# Patient Record
Sex: MALE | Race: WHITE | NOT HISPANIC OR LATINO
[De-identification: names, ages, dates, MRNs, and addresses within clinical notes are randomized per-mention and may not be internally consistent; named-entity substitution may affect disease eponyms.]

---

## 2021-01-01 ENCOUNTER — RESULT REVIEW (OUTPATIENT)
Age: 86
End: 2021-01-01

## 2021-01-01 ENCOUNTER — APPOINTMENT (OUTPATIENT)
Dept: GERIATRICS | Facility: CLINIC | Age: 86
End: 2021-01-01
Payer: MEDICARE

## 2021-01-01 ENCOUNTER — APPOINTMENT (OUTPATIENT)
Dept: NEPHROLOGY | Facility: CLINIC | Age: 86
End: 2021-01-01
Payer: MEDICARE

## 2021-01-01 ENCOUNTER — LABORATORY RESULT (OUTPATIENT)
Age: 86
End: 2021-01-01

## 2021-01-01 ENCOUNTER — APPOINTMENT (OUTPATIENT)
Dept: PULMONOLOGY | Facility: CLINIC | Age: 86
End: 2021-01-01
Payer: MEDICARE

## 2021-01-01 VITALS
RESPIRATION RATE: 17 BRPM | HEART RATE: 62 BPM | TEMPERATURE: 97.9 F | HEIGHT: 67 IN | SYSTOLIC BLOOD PRESSURE: 110 MMHG | OXYGEN SATURATION: 99 % | BODY MASS INDEX: 34.37 KG/M2 | DIASTOLIC BLOOD PRESSURE: 62 MMHG | WEIGHT: 219 LBS

## 2021-01-01 VITALS
SYSTOLIC BLOOD PRESSURE: 122 MMHG | OXYGEN SATURATION: 98 % | RESPIRATION RATE: 17 BRPM | HEART RATE: 67 BPM | WEIGHT: 211 LBS | TEMPERATURE: 97.1 F | BODY MASS INDEX: 33.12 KG/M2 | DIASTOLIC BLOOD PRESSURE: 62 MMHG | HEIGHT: 67 IN

## 2021-01-01 VITALS
BODY MASS INDEX: 33.12 KG/M2 | HEIGHT: 67 IN | HEART RATE: 58 BPM | SYSTOLIC BLOOD PRESSURE: 102 MMHG | TEMPERATURE: 97.4 F | DIASTOLIC BLOOD PRESSURE: 60 MMHG | WEIGHT: 211 LBS | OXYGEN SATURATION: 98 %

## 2021-01-01 VITALS
HEIGHT: 67 IN | OXYGEN SATURATION: 97 % | WEIGHT: 211 LBS | BODY MASS INDEX: 33.12 KG/M2 | TEMPERATURE: 97.8 F | HEART RATE: 62 BPM | SYSTOLIC BLOOD PRESSURE: 124 MMHG | DIASTOLIC BLOOD PRESSURE: 60 MMHG

## 2021-01-01 DIAGNOSIS — E55.9 VITAMIN D DEFICIENCY, UNSPECIFIED: ICD-10-CM

## 2021-01-01 DIAGNOSIS — R91.1 SOLITARY PULMONARY NODULE: ICD-10-CM

## 2021-01-01 DIAGNOSIS — Z87.09 PERSONAL HISTORY OF OTHER DISEASES OF THE RESPIRATORY SYSTEM: ICD-10-CM

## 2021-01-01 DIAGNOSIS — R59.0 LOCALIZED ENLARGED LYMPH NODES: ICD-10-CM

## 2021-01-01 LAB
25(OH)D3 SERPL-MCNC: 15.5 NG/ML
ALBUMIN SERPL ELPH-MCNC: 4.4 G/DL
ALP BLD-CCNC: 80 U/L
ALT SERPL-CCNC: 14 U/L
ANION GAP SERPL CALC-SCNC: 12 MMOL/L
AST SERPL-CCNC: 18 U/L
BASOPHILS # BLD AUTO: 0 K/UL
BASOPHILS NFR BLD AUTO: 0 %
BILIRUB SERPL-MCNC: 0.5 MG/DL
BUN SERPL-MCNC: 56 MG/DL
CALCIUM SERPL-MCNC: 9.3 MG/DL
CHLORIDE SERPL-SCNC: 106 MMOL/L
CO2 SERPL-SCNC: 30 MMOL/L
CREAT SERPL-MCNC: 1.77 MG/DL
EOSINOPHIL # BLD AUTO: 0.36 K/UL
EOSINOPHIL NFR BLD AUTO: 5.3 %
GLUCOSE SERPL-MCNC: 126 MG/DL
HCT VFR BLD CALC: 34.6 %
HGB BLD-MCNC: 11.2 G/DL
LYMPHOCYTES # BLD AUTO: 1.37 K/UL
LYMPHOCYTES NFR BLD AUTO: 20.3 %
MAN DIFF?: NORMAL
MCHC RBC-ENTMCNC: 32.4 GM/DL
MCHC RBC-ENTMCNC: 35.3 PG
MCV RBC AUTO: 109.1 FL
MONOCYTES # BLD AUTO: 0.42 K/UL
MONOCYTES NFR BLD AUTO: 6.2 %
NEUTROPHILS # BLD AUTO: 4.55 K/UL
NEUTROPHILS NFR BLD AUTO: 67.3 %
PLATELET # BLD AUTO: 215 K/UL
POTASSIUM SERPL-SCNC: 4.3 MMOL/L
PROT SERPL-MCNC: 6.7 G/DL
RBC # BLD: 3.17 M/UL
RBC # FLD: 13.2 %
SODIUM SERPL-SCNC: 148 MMOL/L
TSH SERPL-ACNC: 3.58 UIU/ML
VIT B12 SERPL-MCNC: 233 PG/ML
WBC # FLD AUTO: 6.76 K/UL

## 2021-01-01 PROCEDURE — 99214 OFFICE O/P EST MOD 30 MIN: CPT

## 2021-01-01 PROCEDURE — 36415 COLL VENOUS BLD VENIPUNCTURE: CPT

## 2021-01-01 PROCEDURE — 99204 OFFICE O/P NEW MOD 45 MIN: CPT

## 2021-01-01 PROCEDURE — 99214 OFFICE O/P EST MOD 30 MIN: CPT | Mod: 25

## 2021-01-01 RX ORDER — FUROSEMIDE 40 MG/1
40 TABLET ORAL DAILY
Qty: 30 | Refills: 1 | Status: DISCONTINUED | OUTPATIENT
Start: 2021-01-01 | End: 2021-01-01

## 2021-01-01 RX ORDER — MULTIVIT-MIN/IRON/FOLIC ACID/K 18-600-40
50 MCG CAPSULE ORAL DAILY
Qty: 30 | Refills: 2 | Status: ACTIVE | OUTPATIENT
Start: 2021-01-01

## 2021-01-01 RX ORDER — FUROSEMIDE 80 MG/1
TABLET ORAL
Refills: 0 | Status: DISCONTINUED | COMMUNITY
End: 2021-01-01

## 2021-01-01 RX ORDER — PANTOPRAZOLE 40 MG/1
40 TABLET, DELAYED RELEASE ORAL DAILY
Refills: 0 | Status: ACTIVE | COMMUNITY

## 2021-01-01 RX ORDER — ZINC SULFATE TAB 220 MG (50 MG ZINC EQUIVALENT) 220 (50 ZN) MG
220 (50 ZN) TAB ORAL DAILY
Refills: 0 | Status: ACTIVE | COMMUNITY
Start: 2021-01-01

## 2021-01-01 RX ORDER — ATORVASTATIN CALCIUM 10 MG/1
10 TABLET, FILM COATED ORAL DAILY
Refills: 0 | Status: ACTIVE | COMMUNITY

## 2021-01-01 RX ORDER — DICLOFENAC SODIUM 10 MG/G
1 GEL TOPICAL
Refills: 0 | Status: DISCONTINUED | COMMUNITY
End: 2021-01-01

## 2021-01-01 RX ORDER — CHLORHEXIDINE GLUCONATE 4 %
1000 LIQUID (ML) TOPICAL DAILY
Qty: 30 | Refills: 3 | Status: ACTIVE | OUTPATIENT
Start: 2021-01-01

## 2021-01-01 RX ORDER — MULTIVITAMIN
TABLET ORAL
Qty: 90 | Refills: 3 | Status: ACTIVE | COMMUNITY
Start: 2021-01-01 | End: 1900-01-01

## 2021-01-29 PROBLEM — Z00.00 ENCOUNTER FOR PREVENTIVE HEALTH EXAMINATION: Status: ACTIVE | Noted: 2021-01-29

## 2021-02-01 ENCOUNTER — APPOINTMENT (OUTPATIENT)
Dept: PULMONOLOGY | Facility: CLINIC | Age: 86
End: 2021-02-01

## 2021-02-04 ENCOUNTER — RESULT REVIEW (OUTPATIENT)
Age: 86
End: 2021-02-04

## 2021-03-01 ENCOUNTER — APPOINTMENT (OUTPATIENT)
Dept: PULMONOLOGY | Facility: CLINIC | Age: 86
End: 2021-03-01
Payer: MEDICARE

## 2021-03-01 VITALS
WEIGHT: 203 LBS | TEMPERATURE: 97.1 F | BODY MASS INDEX: 31.86 KG/M2 | SYSTOLIC BLOOD PRESSURE: 138 MMHG | OXYGEN SATURATION: 98 % | DIASTOLIC BLOOD PRESSURE: 62 MMHG | HEIGHT: 67 IN | HEART RATE: 70 BPM

## 2021-03-01 DIAGNOSIS — J90 PLEURAL EFFUSION, NOT ELSEWHERE CLASSIFIED: ICD-10-CM

## 2021-03-01 PROCEDURE — 99205 OFFICE O/P NEW HI 60 MIN: CPT

## 2021-03-01 NOTE — REASON FOR VISIT
[Initial] : an initial visit [Abnormal CXR/ Chest CT] : an abnormal CXR/ chest CT [Sleep Apnea] : sleep apnea [Pulmonary Hypertension] : pulmonary hypertension [Pulmonary Nodules] : pulmonary nodules

## 2021-03-01 NOTE — HISTORY OF PRESENT ILLNESS
[TextBox_4] : 85 year old male with Alzheimer's dementia ELLIOT, CHF A. fib Covid infection few months ago, recently hospitalized at [facility] with acute respiratory failure due to CHF exacerbation.\par Hospital records reviewed: admitted with acute respiratory failure requiring O2 and PAP at night. BNP was elevated. CXR with vascular congestion and b/l effusions. He went for Rt thoracentesis with improvement of effusion. The TP was not sent from the effusion.\par He improved and was d/c without O2. \par Cardiology note seen: recommended BiPAP at night and diuresis.\par CXR my read; small left effusion\par CT abdomen lung windows my read: 8 mm nodule RtLL, small left effusion\par Echo: EF 40%. Enlarged Rt and Lt atria, PA pressures 62. \par ABG showed metabolic acidosis.\par \par C/o nasal congestion, for which he is using decongestants. As per medication list he is on Flonase.\par Has cough with sputum production because of that. This problem is getting worse and wants some help with it.\par Has some dyspnea but cannot describe it.\par Hx taken from his son.\par He was not aware of CHF diagnosis.\par He was diagnosed with ELLIOT 6 years ago and sleeps with CPAP.\par He sleeps well with CPAP. Uses it every night. No mask problems.\par Had Covid 2 months ago, did not require O2 or hospitalization.\par \par SHX: quit 1972 20 pack year.\par FHX: denies lung problems\par

## 2021-03-01 NOTE — REVIEW OF SYSTEMS
[Nasal Congestion] : nasal congestion [Postnasal Drip] : postnasal drip [Sinus Problems] : sinus problems [Seasonal Allergies] : seasonal allergies [Nocturia] : nocturia [Memory Loss] : memory loss [Obesity] : obesity [Negative] : Psychiatric [Edema] : no edema [GERD] : no gerd

## 2021-03-08 ENCOUNTER — RESULT REVIEW (OUTPATIENT)
Age: 86
End: 2021-03-08

## 2021-03-14 ENCOUNTER — RESULT REVIEW (OUTPATIENT)
Age: 86
End: 2021-03-14

## 2021-03-23 ENCOUNTER — TRANSCRIPTION ENCOUNTER (OUTPATIENT)
Age: 86
End: 2021-03-23

## 2021-03-24 ENCOUNTER — TRANSCRIPTION ENCOUNTER (OUTPATIENT)
Age: 86
End: 2021-03-24

## 2021-04-07 ENCOUNTER — APPOINTMENT (OUTPATIENT)
Dept: PULMONOLOGY | Facility: CLINIC | Age: 86
End: 2021-04-07
Payer: MEDICARE

## 2021-04-07 VITALS
SYSTOLIC BLOOD PRESSURE: 126 MMHG | DIASTOLIC BLOOD PRESSURE: 62 MMHG | HEART RATE: 72 BPM | WEIGHT: 203 LBS | HEIGHT: 67 IN | BODY MASS INDEX: 31.86 KG/M2 | TEMPERATURE: 97 F | OXYGEN SATURATION: 95 %

## 2021-04-07 PROCEDURE — 99072 ADDL SUPL MATRL&STAF TM PHE: CPT

## 2021-04-07 PROCEDURE — 99215 OFFICE O/P EST HI 40 MIN: CPT

## 2021-04-07 NOTE — PHYSICAL EXAM
[No Acute Distress] : no acute distress [Normal Appearance] : normal appearance [Irregular rate/rhythm] : irregular rate/rhythm [No Resp Distress] : no resp distress [No Clubbing] : no clubbing [No Cyanosis] : no cyanosis [2+ Pitting] : 2+ pitting [No Focal Deficits] : no focal deficits [Normal Affect] : normal affect [TextBox_68] : decreased BS b/l bases

## 2021-04-07 NOTE — REASON FOR VISIT
[Follow-Up] : a follow-up visit [Sleep Apnea] : sleep apnea [Shortness of Breath] : shortness of breath [Pulmonary Nodules] : pulmonary nodules [Family Member] : family member

## 2021-04-07 NOTE — HISTORY OF PRESENT ILLNESS
[TextBox_4] : 85 year old male with Alzheimer's, CHF, ELLIOT on PAP who was recently admitted to the hospital for f/u. he is with his son and wife.\par Last seen in March 2021 \par He had a CT chest and PET since then.\par CT chest my read: b/l effusions, Rt> Lt, lung nodules, significant mediastinal adenopathy\par PET scan seen, report seen: uptake in the mediastinal LN and mild uptake in the lung nodules, suspicious for lymphoma.\par PFT's with moderate obstruction, restriction and severe decrease DLCO.\par \par He has dyspnea at the same level. Albuterol seems to help. Uses it 4 times a day.\par Has orthopnea at night even with PAP. Uses PAP every night and does not have mask problems.\par Denies cough or hemoptysis.\par His son said that the nurses at his residence check spot POX and at times it is < 88%, but he is not sure if patient is on PAP or not during that time\par Used to smoke.\par Has leg edema b/l\par Did not see a Cardiologist yet\par \par

## 2021-04-07 NOTE — REVIEW OF SYSTEMS
[Postnasal Drip] : postnasal drip [Dyspnea] : dyspnea [Edema] : edema [Orthopnea] : orthopnea [Nocturia] : nocturia [Arthralgias] : arthralgias [Memory Loss] : memory loss [Obesity] : obesity [Negative] : Psychiatric

## 2021-04-08 ENCOUNTER — NON-APPOINTMENT (OUTPATIENT)
Age: 86
End: 2021-04-08

## 2021-04-09 ENCOUNTER — NON-APPOINTMENT (OUTPATIENT)
Age: 86
End: 2021-04-09

## 2021-04-15 ENCOUNTER — NON-APPOINTMENT (OUTPATIENT)
Age: 86
End: 2021-04-15

## 2021-04-16 ENCOUNTER — APPOINTMENT (OUTPATIENT)
Dept: CARDIOLOGY | Facility: CLINIC | Age: 86
End: 2021-04-16
Payer: MEDICARE

## 2021-04-16 VITALS
OXYGEN SATURATION: 92 % | BODY MASS INDEX: 31.79 KG/M2 | DIASTOLIC BLOOD PRESSURE: 60 MMHG | TEMPERATURE: 97.9 F | SYSTOLIC BLOOD PRESSURE: 136 MMHG | HEART RATE: 57 BPM | WEIGHT: 203 LBS

## 2021-04-16 DIAGNOSIS — Z80.3 FAMILY HISTORY OF MALIGNANT NEOPLASM OF BREAST: ICD-10-CM

## 2021-04-16 DIAGNOSIS — Z86.79 PERSONAL HISTORY OF OTHER DISEASES OF THE CIRCULATORY SYSTEM: ICD-10-CM

## 2021-04-16 DIAGNOSIS — Z86.39 PERSONAL HISTORY OF OTHER ENDOCRINE, NUTRITIONAL AND METABOLIC DISEASE: ICD-10-CM

## 2021-04-16 DIAGNOSIS — Z87.891 PERSONAL HISTORY OF NICOTINE DEPENDENCE: ICD-10-CM

## 2021-04-16 DIAGNOSIS — R93.89 ABNORMAL FINDINGS ON DIAGNOSTIC IMAGING OF OTHER SPECIFIED BODY STRUCTURES: ICD-10-CM

## 2021-04-16 DIAGNOSIS — Z86.69 PERSONAL HISTORY OF OTHER DISEASES OF THE NERVOUS SYSTEM AND SENSE ORGANS: ICD-10-CM

## 2021-04-16 DIAGNOSIS — U07.1 COVID-19: ICD-10-CM

## 2021-04-16 DIAGNOSIS — Z78.9 OTHER SPECIFIED HEALTH STATUS: ICD-10-CM

## 2021-04-16 PROCEDURE — 93000 ELECTROCARDIOGRAM COMPLETE: CPT

## 2021-04-16 PROCEDURE — 99214 OFFICE O/P EST MOD 30 MIN: CPT

## 2021-04-17 ENCOUNTER — NON-APPOINTMENT (OUTPATIENT)
Age: 86
End: 2021-04-17

## 2021-04-17 PROBLEM — Z80.3 FAMILY HISTORY OF MALIGNANT NEOPLASM OF BREAST: Status: ACTIVE | Noted: 2021-04-17

## 2021-04-17 PROBLEM — R93.89 ABNORMAL CHEST CT: Status: RESOLVED | Noted: 2021-04-17 | Resolved: 2021-04-17

## 2021-04-17 PROBLEM — Z87.891 FORMER SMOKER: Status: ACTIVE | Noted: 2021-04-17

## 2021-04-17 PROBLEM — Z86.79 HISTORY OF CHRONIC ATRIAL FIBRILLATION: Status: RESOLVED | Noted: 2021-04-17 | Resolved: 2021-04-17

## 2021-04-17 PROBLEM — Z78.9 SOCIAL ALCOHOL USE: Status: ACTIVE | Noted: 2021-04-17

## 2021-04-17 PROBLEM — Z86.79 HISTORY OF HYPERTENSION: Status: RESOLVED | Noted: 2021-04-17 | Resolved: 2021-04-17

## 2021-04-17 PROBLEM — Z86.39 HISTORY OF OBESITY: Status: RESOLVED | Noted: 2021-04-17 | Resolved: 2021-04-17

## 2021-04-17 PROBLEM — U07.1 COVID-19: Status: RESOLVED | Noted: 2021-04-17 | Resolved: 2021-04-17

## 2021-04-17 PROBLEM — Z86.69 HISTORY OF SLEEP APNEA: Status: RESOLVED | Noted: 2021-04-17 | Resolved: 2021-04-17

## 2021-04-17 PROBLEM — Z86.39 HISTORY OF HYPERLIPIDEMIA: Status: RESOLVED | Noted: 2021-04-17 | Resolved: 2021-04-17

## 2021-04-17 RX ORDER — MEMANTINE HYDROCHLORIDE AND DONEPEZIL HYDROCHLORIDE 28; 10 MG/1; MG/1
28-10 CAPSULE ORAL
Refills: 0 | Status: ACTIVE | COMMUNITY

## 2021-04-17 NOTE — REVIEW OF SYSTEMS
[Feeling Fatigued] : feeling fatigued [Eyeglasses] : currently wearing eyeglasses [Shortness Of Breath] : shortness of breath [see HPI] : see HPI [Joint Pain] : joint pain [Negative] : Heme/Lymph

## 2021-04-17 NOTE — PHYSICAL EXAM
[Normal Conjunctiva] : the conjunctiva exhibited no abnormalities [Bowel Sounds] : normal bowel sounds [Abdomen Soft] : soft [Abdomen Tenderness] : non-tender [Abnormal Walk] : normal gait [] : no rash [Affect] : the affect was normal [FreeTextEntry1] : pleasant

## 2021-04-17 NOTE — HISTORY OF PRESENT ILLNESS
[FreeTextEntry1] : 85-year-old man\par Cardiology consultation requested by Dr. Good because of shortness of breath\par \par Mr. Washington has known chronic atrial fibrillation and congestive heart failure. There is no history of a myocardial infarction. He was hospitalized in 1-2/21 because of acute hypoxic respiratory insufficiency. He was found to have evidence of Covid 19 viral infection and congestive heart failure. A thoracentesis revealed 800 cc of transudative pleural effusion. An echocardiographic study demonstrated a left ventricular ejection fraction 47% with pulmonary hypertension. The pulmonary systolic pressure was 60-65 mmHg. There was no significant valvular pathology.\par \par \par Mr. Washington complains of shortness of breath and orthopnea. No chest pain. No palpitations. No syncope.\par \par 3/21 Chest CAT /PET scans are consistent with lymphoma/malignancy and lung nodules . The patient and family have declined further investigation or treatment in this regard.\par \par There is a prior history of hypertension and hyperlipidemia. There is no history of diabetes.\par He smoked 2 packs of cigarettes/day stopping in 1973.\par \par \par Mr. Washington presents today for cardiovascular evaluation

## 2021-04-17 NOTE — DISCUSSION/SUMMARY
[FreeTextEntry1] : Congestive heart failure\par The working diagnosis is congestive heart failure due to heart failure with reduced ejection fraction (47% 2/21 echocardiogram) secondary to hypertensive-probable atherosclerotic heart disease. The history and physical findings are consistent with this diagnosis. In the setting of heart failure with reduced ejection fraction ACE-I/ARB  Neprilysin inhibition and beta blocker therapy are attractive. The present cardiac physical  examination is consistent with the presence of pleural effusions and New York Heart Association class III.\par \par I have recommended the following\par a. Discontinue diltiazem\par b. Carvedilol 6.25 mg b.i.d. with monitoring for bronchospasm and further dose adjustment dictated by tolerance and response\par c. Metolazone 5 mg /day with monitoring of electrolytes and renal function\par d. Thoracentesis dependent on response to above measures and the patient's clinical course\par e. No further cardiac testing for this problem at this time\par \par \par \par Atrial fibrillation\par The working diagnosis is chronic atrial fibrillation secondary to hypertensive-atherosclerotic heart disease exacerbated by obesity. An elevated"[id number]" score is indicative of an increased risk of systemic/cerebral emboli. The patient has been reportedly been treated with vitamin K antagonists therapy. However it is vague as to the monitoring of INR levels and appropriate dose adjustment of Coumadin.. Factor X A. inhibitor therapy may provide more adequate protection. In the setting of left ventricular systolic dysfunction beta blocker therapy for control of the ventricular response in  atrial fibrillation is more attractive than  calcium entry blocker therapy.\par \par I have recommended the following\par a. Discontinue diltiazem\par b. Carvedilol 6.25 mg b.i.d. with monitoring for bronchospasm and further dose adjustment dictated by tolerance and response\par c. Coumadin dose adjustment as required to target INR 2-3 through primary care physician\par d. Factor Xa. inhibitor therapy to replace Coumadin if/when patient/family consent\par \par \par Hypertension\par In the setting of left ventricular systolic dysfunction/congestive heart failure ACE-I/ARB and beta blocker therapy would provide more benefit  than calcium entry blocker therapy\par \par I have recommended the following\par a. Discontinue diltiazem\par b. Carvedilol 6.25 mg b.i.d. with monitoring for bronchospasm and further dose adjustment dictated by tolerance and response\par c. Addition of ACE-I/ARB therapy dependent on the patient's response to the above measures and hemodynamics/renal function/electrolytes\par \par \par Hyperlipidemia\par Hyperlipidemia represents a risk factor for atherosclerotic heart disease. There is no clear history of ischemic heart disease. A stress test performed 5-10 years ago was reportedly normal. The details of that evaluation are not available. The target LDL level for primary prevention is about 100. The most recent lipid levels are not available for review. The dose of atorvastatin may be increased if required to obtain optimum levels. Nonpharmacological therapy, specifically diet and exercise are emphasized his major aspects of treatment\par \par \par I have recommended the following\par a. Prior cardiac records not available at this time requested for review\par b. Target LDL level to about 100 as discussed above\par c. Low-salt low-fat low-cholesterol diet. Exercise to the extent possible\par d. Fasting lipid levels are ordered\par e. Increase atorvastatin dose if required to obtain optimum levels as noted above\par \par \par Dementia\par The patient carries a diagnosis of Alzheimer's dementia. In the setting of atrial fibrillation a multi-infarct contribution to dementia  is  common. This problem represents a major health threat. The patient's age and mental status are major influences on management decisions\par \par \par \par Obesity\par Obesity exacerbates Mr. Washington's cardiovascular issues. Today Olvin is  5 feet 7 inches tall and weighs 203 pounds. Diet exercise and weight loss are  advised\par \par \par The diagnosis, prognosis, risks, options and alternatives were explained at length to the patient and family. All questions were answered. Issues discussed included shortness of breath congestive heart failure pleural effusions left ventricular systolic dysfunction hypertension hyperlipidemia chronic obstructive lung disease noninvasive cardiac testing anticoagulation with various treatment options and monitoring of electrolytes/renal function.\par \par \par Counseling and/or coordination of care\par Time was a significant factor for this patient encounter. Total time spent with the patient and family was 60 minutes. Greater than 50% of the time was devoted to counseling and/or coordination of care\par \par \par \par \par \par

## 2021-04-27 ENCOUNTER — RESULT REVIEW (OUTPATIENT)
Age: 86
End: 2021-04-27

## 2021-04-27 ENCOUNTER — APPOINTMENT (OUTPATIENT)
Dept: GERIATRICS | Facility: CLINIC | Age: 86
End: 2021-04-27
Payer: MEDICARE

## 2021-04-27 VITALS — SYSTOLIC BLOOD PRESSURE: 110 MMHG | DIASTOLIC BLOOD PRESSURE: 60 MMHG

## 2021-04-27 VITALS
TEMPERATURE: 97.3 F | OXYGEN SATURATION: 98 % | WEIGHT: 198 LBS | BODY MASS INDEX: 31.08 KG/M2 | HEART RATE: 87 BPM | SYSTOLIC BLOOD PRESSURE: 100 MMHG | DIASTOLIC BLOOD PRESSURE: 60 MMHG | HEIGHT: 67 IN

## 2021-04-27 PROCEDURE — 99204 OFFICE O/P NEW MOD 45 MIN: CPT

## 2021-04-27 NOTE — PHYSICAL EXAM
[General Appearance - Alert] : alert [General Appearance - In No Acute Distress] : in no acute distress [General Appearance - Well Nourished] : well nourished [General Appearance - Well Developed] : well developed [General Appearance - Well-Appearing] : healthy appearing [] : normal voice and communication [Sclera] : the sclera and conjunctiva were normal [PERRL With Normal Accommodation] : pupils were equal in size, round, and reactive to light [Outer Ear] : the ears and nose were normal in appearance [Both Tympanic Membranes Were Examined] : both tympanic membranes were normal [Apical Impulse] : the apical impulse was normal [Abdomen Soft] : soft [Abdomen Tenderness] : non-tender [No Spinal Tenderness] : no spinal tenderness [Involuntary Movements] : no involuntary movements were seen [FreeTextEntry1] : [facility] 28/30

## 2021-04-27 NOTE — ASSESSMENT
[FreeTextEntry1] : pt with a fib and CHF - less edema, lost 5 lbs in 11 days\par BP low normal\par breathing better but tired\par encourage change to eliquis\par \par CT PET - possible lymphoma\par results of effusion scanned in \par \par AD - mild - some word    ffinding defecits\par scored 28/30\par lives in assisted living\par \par checking labs today\par \par to return next week with wife\par \par

## 2021-04-27 NOTE — REVIEW OF SYSTEMS
[Feeling Poorly] : feeling poorly [Feeling Tired] : feeling tired [Loss Of Hearing] : hearing loss [Confused] : confusion [Dizziness] : no dizziness [Negative] : Heme/Lymph [FreeTextEntry4] : hearing aides, post nasal drip - congestion [de-identified] : some word seraching

## 2021-04-27 NOTE — HISTORY OF PRESENT ILLNESS
[One fall no injury in past year] : Patient reported one fall in the past year without injury [Fully functional (bathing, dressing, toileting, transferring, walking, feeding)] : Fully functional (bathing, dressing, toileting, transferring, walking, feeding) [Canes] : brianne [Smoke Detector] : smoke detector [Carbon Monoxide Detector] : carbon monoxide detector [Grab Bars] : grab bars [1] : 2) Feeling down, depressed, or hopeless for several days [PHQ-2 Score ___] : PHQ-2 Score [unfilled] [Shower Chair] : no shower chair [Driving] : not driving [de-identified] : PT lives in assisting living [FreeTextEntry1] : PT said on/off depressed sometimes [FreeTextEntry4] : need to check if son official proxy

## 2021-04-29 ENCOUNTER — APPOINTMENT (OUTPATIENT)
Dept: CARDIOLOGY | Facility: CLINIC | Age: 86
End: 2021-04-29

## 2021-05-17 ENCOUNTER — APPOINTMENT (OUTPATIENT)
Dept: CARDIOLOGY | Facility: CLINIC | Age: 86
End: 2021-05-17
Payer: MEDICARE

## 2021-05-17 VITALS
HEART RATE: 92 BPM | DIASTOLIC BLOOD PRESSURE: 52 MMHG | SYSTOLIC BLOOD PRESSURE: 90 MMHG | WEIGHT: 199 LBS | BODY MASS INDEX: 31.17 KG/M2 | OXYGEN SATURATION: 98 %

## 2021-05-17 PROCEDURE — 99214 OFFICE O/P EST MOD 30 MIN: CPT

## 2021-05-18 ENCOUNTER — RESULT REVIEW (OUTPATIENT)
Age: 86
End: 2021-05-18

## 2021-05-18 ENCOUNTER — APPOINTMENT (OUTPATIENT)
Dept: GERIATRICS | Facility: CLINIC | Age: 86
End: 2021-05-18

## 2021-05-18 NOTE — HISTORY OF PRESENT ILLNESS
[FreeTextEntry1] : 85-year-old man\par Routine followup\par "I feel much better."  Olvin states that his breathing is significantly improved and peripheral edema has abated. No chest pain. No palpitations. No syncope. Main complaint is that of sinus congestion "postnasal drip"  He is anticipating an ENT evaluation with Dr. Bell

## 2021-05-18 NOTE — DISCUSSION/SUMMARY
[FreeTextEntry1] : Congestive heart failure\par The working diagnosis is compensated  congestive heart failure due to heart failure with reduced ejection fraction (47% 2/21 echocardiogram) secondary to hypertensive-probable atherosclerotic heart disease.  In the setting of heart failure with reduced ejection fraction ACE-I/ARB  Neprilysin inhibition and beta blocker therapy are attractive.\par There has been a favorable response to the present medical regimen. The present cardiac the examination is consistent with a euvolemic state New York Heart Association class II-III I.\par \par I have recommended the following\par a. Continue the present medical regimen\par b  No further cardiac testing for this problem at this time\par c. Monitoring of electrolytes and renal function through primary care Dr. Mcfadden\par d. Daily weights\par \par \par \par Atrial fibrillation\par The working diagnosis is chronic atrial fibrillation secondary to hypertensive-atherosclerotic heart disease exacerbated by obesity. An elevated"[id number]" score is indicative of an increased risk of systemic/cerebral emboli. The patient has been  been treated with vitamin K antagonists therapy. Monitoring of INR levels has been intermittent.  Factor X A. inhibitor therapy may provide more adequate reliable protection.The ventricular response in  atrial fibrillation appears to be controlled on the present medical regimen\par \par I have recommended the following\par a.  Discontinue . Coumadin \par b  Apixaban 2.5 mg bid\par c. Anticipate Holter monitor/Zio patch study to assess adequacy of rate control of the ventricular response later 2021\par \par \par \par \par Hypertension\par Hypertension is controlled on present medical regimen. In the setting of congestive heart failure left ventricular systolic dysfunction  and atrial fibrillation beta blocker and ACE-I/ARB therapy are attractive\par \par I have recommended the following\par a. Continue the present medical regimen\par b. Addition of ACE-I/ARB therapy dependent on the patient's  follow up  hemodynamics/renal function/electrolytes\par \par \par \par \par Hyperlipidemia\par Hyperlipidemia represents a risk factor for atherosclerotic heart disease. There is no clear history of ischemic heart disease. A stress test performed 5-10 years ago was reportedly normal. The details of that evaluation are not available. The target LDL level for primary prevention is about 100.HMG coA reductase inhibitor therapy has been effective. In 4/21 the  serum cholesterol level was  125 triglycerides 134 HDL 47 LDL 51. The dose of atorvastatin may be decreased dependent on followup lipid levels . Nonpharmacological therapy, specifically diet and exercise are emphasized his major aspects of treatment\par \par \par I have recommended the following\par a. Prior cardiac records not available at this time requested for review\par b. Target LDL level to about 100 as discussed above\par c. Low-salt low-fat low-cholesterol diet. Exercise to the extent possible\par d. decrease atorvastatin dose dependent on followup lipid levels  as discussed above\par \par \par \par \par Dementia\par The patient carries a diagnosis of Alzheimer's dementia. In the setting of atrial fibrillation a multi-infarct contribution to dementia  is  common. This problem represents a major health threat. The patient's age and mental status are major influences on management decisions\par \par \par \par Obesity\par Obesity exacerbates Mr. Washington's cardiovascular issues. Today Olvin is  5 feet 7 inches tall and weighs 199  pounds. Diet exercise and weight loss are  advised\par \par \par The diagnosis, prognosis, risks, options and alternatives were explained at length to the patient and family. All questions were answered. Issues discussed included shortness of breath congestive heart failure pleural effusions left ventricular systolic dysfunction hypertension hyperlipidemia chronic obstructive lung disease noninvasive cardiac testing anticoagulation with various treatment options and monitoring of electrolytes/renal function.\par \par \par Counseling and/or coordination of care\par Time was a significant factor for this patient encounter. Total time spent with the patient and family was 30  minutes. Greater than 50% of the time was devoted to counseling and/or coordination of care\par \par \par \par \par \par

## 2021-06-01 ENCOUNTER — APPOINTMENT (OUTPATIENT)
Dept: CARDIOLOGY | Facility: CLINIC | Age: 86
End: 2021-06-01
Payer: MEDICARE

## 2021-06-01 PROCEDURE — 99442: CPT | Mod: 95

## 2021-06-02 ENCOUNTER — APPOINTMENT (OUTPATIENT)
Dept: PULMONOLOGY | Facility: CLINIC | Age: 86
End: 2021-06-02
Payer: MEDICARE

## 2021-06-02 VITALS
RESPIRATION RATE: 16 BRPM | SYSTOLIC BLOOD PRESSURE: 102 MMHG | WEIGHT: 191 LBS | HEART RATE: 78 BPM | DIASTOLIC BLOOD PRESSURE: 60 MMHG | TEMPERATURE: 98 F | OXYGEN SATURATION: 98 % | BODY MASS INDEX: 29.98 KG/M2 | HEIGHT: 67 IN

## 2021-06-02 PROCEDURE — 99214 OFFICE O/P EST MOD 30 MIN: CPT

## 2021-06-02 NOTE — PHYSICAL EXAM
[No Acute Distress] : no acute distress [Normal Appearance] : normal appearance [Irregular rate/rhythm] : irregular rate/rhythm [No Resp Distress] : no resp distress [No Acc Muscle Use] : no acc muscle use [Clear to Auscultation Bilaterally] : clear to auscultation bilaterally [No Clubbing] : no clubbing [No Cyanosis] : no cyanosis [1+ Pitting] : 1+ pitting [No Focal Deficits] : no focal deficits [TextBox_140] : confused

## 2021-06-02 NOTE — HISTORY OF PRESENT ILLNESS
[TextBox_4] : 85 year old male with Alzheimer's, CHF, ELLIOT, multiple lung nodules with mediastinal adenopathy for f/u.\par Last seen in April 2021.\par At that time Incruse was added\par Had PSG with severe ELLIOT AHI 34.5, lowest saturation 80%. No Cheyne Gutiérrez or central apnea.\par No significant desaturations other that with respiratory events.\par \par He uses Incruse every day. Dyspnea is better.\par He uses PAP every night.\par Would like to have a new machine if possible.\par No cough, no hemoptysis\par He is here with his son who provides Hx.\par \par \par

## 2021-06-02 NOTE — REVIEW OF SYSTEMS
[Postnasal Drip] : postnasal drip [Dyspnea] : dyspnea [Edema] : edema [Memory Loss] : memory loss [Negative] : Gastrointestinal

## 2021-07-11 ENCOUNTER — RX RENEWAL (OUTPATIENT)
Age: 86
End: 2021-07-11

## 2021-07-11 RX ORDER — APIXABAN 2.5 MG/1
2.5 TABLET, FILM COATED ORAL
Qty: 180 | Refills: 3 | Status: ACTIVE | COMMUNITY
Start: 2021-05-17 | End: 1900-01-01

## 2021-07-20 ENCOUNTER — APPOINTMENT (OUTPATIENT)
Dept: GERIATRICS | Facility: CLINIC | Age: 86
End: 2021-07-20
Payer: MEDICARE

## 2021-07-20 VITALS
HEART RATE: 62 BPM | BODY MASS INDEX: 31.32 KG/M2 | TEMPERATURE: 98.3 F | SYSTOLIC BLOOD PRESSURE: 132 MMHG | WEIGHT: 200 LBS | DIASTOLIC BLOOD PRESSURE: 70 MMHG | OXYGEN SATURATION: 99 %

## 2021-07-20 PROCEDURE — 99214 OFFICE O/P EST MOD 30 MIN: CPT

## 2021-07-20 NOTE — ASSESSMENT
[FreeTextEntry1] : pt is feeling much better\par not doing w/up on lung nodules - no SOB\par breathing well\par bothered by runny nose - had procedure to lessen\par restart the flonase\par had labs in april\par received medical records

## 2021-07-20 NOTE — PHYSICAL EXAM
[General Appearance - Alert] : alert [General Appearance - In No Acute Distress] : in no acute distress [General Appearance - Well Nourished] : well nourished [General Appearance - Well Developed] : well developed [General Appearance - Well-Appearing] : healthy appearing [Sclera] : the sclera and conjunctiva were normal [PERRL With Normal Accommodation] : pupils were equal in size, round, and reactive to light [Outer Ear] : the ears and nose were normal in appearance [Neck Appearance] : the appearance of the neck was normal [Respiration, Rhythm And Depth] : normal respiratory rhythm and effort [Exaggerated Use Of Accessory Muscles For Inspiration] : no accessory muscle use [Auscultation Breath Sounds / Voice Sounds] : lungs were clear to auscultation bilaterally [Apical Impulse] : the apical impulse was normal [Murmurs] : no murmurs [Abdomen Soft] : soft [Abdomen Tenderness] : non-tender [No Spinal Tenderness] : no spinal tenderness [Involuntary Movements] : no involuntary movements were seen [] : no rash [No Focal Deficits] : no focal deficits [FreeTextEntry1] : [facility] 28/30 last visit

## 2021-07-20 NOTE — HISTORY OF PRESENT ILLNESS
[No falls in past year] : Patient reported no falls in the past year [Fully functional (bathing, dressing, toileting, transferring, walking, feeding)] : Fully functional (bathing, dressing, toileting, transferring, walking, feeding) [Fully functional (using the telephone, shopping, preparing meals, housekeeping, doing laundry, using transportation,] : Fully functional and needs no help or supervision to perform IADLs (using the telephone, shopping, preparing meals, housekeeping, doing laundry, using transportation, managing medications and managing finances) [0] : 2) Feeling down, depressed, or hopeless: Not at all [FreeTextEntry1] : 85 year old man here with wife and son\par \par a fib - on coumadin\par h/o covid with admit in Jan 2021 for hypoxia, plueral effusion\par chf\par alzheimers\par CT PET - pulmonary nodules - possible lymphoma\par had some positive \par \par lives  in the [place] with wife in assisted living\par \par has w/up for memory - recently switched from arciept and namenda to namzeric\par \par seeing cardiology and pulmonary drs\par neuro next week\par \par pt is retired stastics professor and psychologist - PHD from [employer]\par \par 7/20/21\par c/o runny nose\par halls/ricola\par post nasal drip\par

## 2021-08-03 ENCOUNTER — RX RENEWAL (OUTPATIENT)
Age: 86
End: 2021-08-03

## 2021-08-03 RX ORDER — CARVEDILOL 6.25 MG/1
6.25 TABLET, FILM COATED ORAL
Qty: 180 | Refills: 3 | Status: ACTIVE | COMMUNITY
Start: 2021-04-16 | End: 1900-01-01

## 2021-08-17 ENCOUNTER — APPOINTMENT (OUTPATIENT)
Dept: PULMONOLOGY | Facility: CLINIC | Age: 86
End: 2021-08-17
Payer: MEDICARE

## 2021-08-17 ENCOUNTER — APPOINTMENT (OUTPATIENT)
Dept: CARDIOLOGY | Facility: CLINIC | Age: 86
End: 2021-08-17
Payer: MEDICARE

## 2021-08-17 VITALS
HEART RATE: 67 BPM | RESPIRATION RATE: 18 BRPM | HEIGHT: 67 IN | OXYGEN SATURATION: 94 % | SYSTOLIC BLOOD PRESSURE: 126 MMHG | WEIGHT: 200 LBS | BODY MASS INDEX: 31.39 KG/M2 | DIASTOLIC BLOOD PRESSURE: 80 MMHG | TEMPERATURE: 97.2 F

## 2021-08-17 VITALS
OXYGEN SATURATION: 84 % | WEIGHT: 204.31 LBS | DIASTOLIC BLOOD PRESSURE: 46 MMHG | BODY MASS INDEX: 32.07 KG/M2 | HEART RATE: 68 BPM | SYSTOLIC BLOOD PRESSURE: 113 MMHG | HEIGHT: 67 IN

## 2021-08-17 PROCEDURE — 99214 OFFICE O/P EST MOD 30 MIN: CPT

## 2021-08-17 PROCEDURE — 99215 OFFICE O/P EST HI 40 MIN: CPT

## 2021-08-17 NOTE — REVIEW OF SYSTEMS
[Postnasal Drip] : postnasal drip [Memory Loss] : memory loss [Negative] : Hematologic [TextBox_30] : see HPI

## 2021-08-17 NOTE — REASON FOR VISIT
[Follow-Up] : a follow-up visit [Sleep Apnea] : sleep apnea [Pulmonary Hypertension] : pulmonary hypertension [Pulmonary Nodules] : pulmonary nodules

## 2021-08-17 NOTE — PHYSICAL EXAM
[No Acute Distress] : no acute distress [Normal Appearance] : normal appearance [No Neck Mass] : no neck mass [Irregular rate/rhythm] : irregular rate/rhythm [No Resp Distress] : no resp distress [No Acc Muscle Use] : no acc muscle use [Clear to Auscultation Bilaterally] : clear to auscultation bilaterally [No Clubbing] : no clubbing [No Cyanosis] : no cyanosis [No Edema] : no edema [Normal Turgor] : normal turgor [No Focal Deficits] : no focal deficits

## 2021-08-17 NOTE — HISTORY OF PRESENT ILLNESS
[TextBox_4] : 86 year old male with severe ELLIOT on PAP, PH, lung nodules and mediastinal adenopathy for f/u. He is here with his son who provides most of the history.\par Last seen in June 2021\par Compliance report 08/15:\par Usage 99%\par Average use 7 hrs 52 min\par Auto PAP 6-20\par 95% pressure 11.9\par Leak 30 LPM\par AHI 3.7\par \par He has less dyspnea. Still c/o postnasal drip.\par He wakes up 2-3 times a night for the postnasal drip.\par He had an ENT procedure for the postnasal drip. Felt better for some time but then the symptoms reoccurred.\par No cough in the daytime. No hospitalizations since the last visit\par he takes Incruse every day. No Albuterol use as per his son.\par Also ENT started on Ipratropium nasal spray.\par Sleeps well with PAP. No mask problems.\par \par SHX: does not smoke\par \par \par

## 2021-08-18 ENCOUNTER — RX RENEWAL (OUTPATIENT)
Age: 86
End: 2021-08-18

## 2021-08-19 NOTE — PHYSICAL EXAM
[Normal Conjunctiva] : the conjunctiva exhibited no abnormalities [Abdomen Soft] : soft [Bowel Sounds] : normal bowel sounds [Abdomen Tenderness] : non-tender [Abnormal Walk] : normal gait [] : no rash [Affect] : the affect was normal [FreeTextEntry1] : pleasant

## 2021-08-19 NOTE — HISTORY OF PRESENT ILLNESS
[FreeTextEntry1] : 86-year-old man\par Routine followup\par Main complaint continues to be that of sinus congestion and resulant difficulty sleeping due to  mucous collection. Sinus surgery did not provide dramatic benefit. Mr. Washington denies chest pain, shortness of breath at rest palpitations or syncope. Peripheral edema has resolved..\par \par Mr. Washington is accompanied today by his son

## 2021-08-19 NOTE — DISCUSSION/SUMMARY
[FreeTextEntry1] : Congestive heart failure\par The working diagnosis is compensated  congestive heart failure due to heart failure with reduced ejection fraction (47% 2/21 echocardiogram) secondary to hypertensive-probable atherosclerotic heart disease.  In the setting of heart failure with reduced ejection fraction ACE-I/ARB  Neprilysin inhibition and beta blocker therapy are attractive.\par There has been a favorable response to the present medical regimen. The present cardiac the examination is consistent with a euvolemic state New York Heart Association class II-III .\par \par I have recommended the following\par a. Continue the present medical regimen\par b  No further cardiac testing for this problem at this time\par c. Monitoring of electrolytes and renal function through primary care Dr. Mcfadden\par d. Daily weights\par e Echocardiogram with next office evaluation in 6 months\par \par \par \par Atrial fibrillation\par The working diagnosis is chronic atrial fibrillation secondary to hypertensive-atherosclerotic heart disease exacerbated by obesity. An elevated"[id number]" score is indicative of an increased risk of systemic/cerebral emboli. In 5/21 Apixaban replaced vitamin K antagonists therapy .The ventricular response in  atrial fibrillation appears to be controlled on the present medical regimen\par \par I have recommended the following\par a.   Anticipate Holter monitor/Zio patch study to assess adequacy of rate control of the ventricular response  2022\par \par \par \par \par Hypertension\par Hypertension is controlled on present medical regimen. In the setting of congestive heart failure left ventricular systolic dysfunction  and atrial fibrillation beta blocker and ACE-I/ARB therapy are attractive\par \par I have recommended the following\par a. Continue the present medical regimen\par b. Addition of ACE-I/ARB therapy dependent on the patient's  follow up  hemodynamics/renal function/electrolytes\par \par \par \par \par Hyperlipidemia\par Hyperlipidemia represents a risk factor for atherosclerotic heart disease. There is no clear history of ischemic heart disease. A stress test performed 5-10 years ago was reportedly normal. The details of that evaluation are not available. The target LDL level for primary prevention is about 100.HMG coA reductase inhibitor therapy has been effective. In 4/21 the  serum cholesterol level was  125 triglycerides 134 HDL 47 LDL 51. The dose of atorvastatin may be decreased dependent on followup lipid levels . Nonpharmacological therapy, specifically diet and exercise are emphasized his major aspects of treatment\par \par \par I have recommended the following\par a. Prior cardiac records not available at this time requested for review\par b. Target LDL level to about 100 as discussed above\par c. Low-salt low-fat low-cholesterol diet. Exercise to the extent possible\par d. decrease atorvastatin dose dependent on followup lipid levels  as discussed above\par \par \par \par \par Dementia\par The patient carries a diagnosis of Alzheimer's dementia. In the setting of atrial fibrillation a multi-infarct contribution to dementia  is  common. This problem represents a major health threat. The patient's age and mental status are major influences on management decisions\par \par \par \par Obesity\par Obesity exacerbates Mr. Washington's cardiovascular issues. Today Olvin is  5 feet 7 inches tall and weighs 204  pounds.  He has gained 5 pounds in the last 5 months..  Diet exercise and weight loss are  advised\par \par \par The diagnosis, prognosis, risks, options and alternatives were explained at length to the patient and family. All questions were answered. Issues discussed included shortness of breath congestive heart failure pleural effusions left ventricular systolic dysfunction hypertension hyperlipidemia chronic obstructive lung disease noninvasive cardiac testing anticoagulation with various treatment options and monitoring of electrolytes/renal function.\par \par \par Counseling and/or coordination of care\par Time was a significant factor for this patient encounter. Total time spent with the patient and family was 30  minutes. Greater than 50% of the time was devoted to counseling and/or coordination of care\par \par \par \par \par \par

## 2021-08-19 NOTE — REVIEW OF SYSTEMS
[Feeling Fatigued] : feeling fatigued [Hearing Loss] : hearing loss [Sinus Pressure] : sinus pressure [Joint Pain] : joint pain [Negative] : Heme/Lymph [FreeTextEntry3] : glasses [FreeTextEntry5] : see history of present illness

## 2021-09-17 ENCOUNTER — RX RENEWAL (OUTPATIENT)
Age: 86
End: 2021-09-17

## 2021-10-12 NOTE — REVIEW OF SYSTEMS
[Recent Weight Gain (___ Lbs)] : recent [unfilled] ~Ulb weight gain [Confused] : confusion [Negative] : Heme/Lymph [Feeling Tired] : feeling tired [FreeTextEntry4] : runny nose

## 2021-10-12 NOTE — ASSESSMENT
[FreeTextEntry1] : pt is feeling much better\par not doing w/up on lung nodules - no SOB\par breathing well\par bothered by runny nose - had procedure to lessen\par restart the flonase\par had labs in april\par received medical records\par \par 10/12/21\par forms for [employer]\par checkc labs\par had flu vaccine\par booster pending\par

## 2021-10-12 NOTE — PHYSICAL EXAM
[General Appearance - Alert] : alert [General Appearance - In No Acute Distress] : in no acute distress [General Appearance - Well Nourished] : well nourished [General Appearance - Well Developed] : well developed [General Appearance - Well-Appearing] : healthy appearing [Sclera] : the sclera and conjunctiva were normal [PERRL With Normal Accommodation] : pupils were equal in size, round, and reactive to light [Outer Ear] : the ears and nose were normal in appearance [Neck Appearance] : the appearance of the neck was normal [Respiration, Rhythm And Depth] : normal respiratory rhythm and effort [Exaggerated Use Of Accessory Muscles For Inspiration] : no accessory muscle use [Auscultation Breath Sounds / Voice Sounds] : lungs were clear to auscultation bilaterally [Apical Impulse] : the apical impulse was normal [Murmurs] : no murmurs [Abdomen Soft] : soft [Abdomen Tenderness] : non-tender [No Spinal Tenderness] : no spinal tenderness [Involuntary Movements] : no involuntary movements were seen [] : no rash [No Focal Deficits] : no focal deficits [FreeTextEntry1] : [facility] 28/30 last visit

## 2021-10-12 NOTE — HISTORY OF PRESENT ILLNESS
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[employer]\par \par 7/20/21\par c/o runny nose\par halls/ricola\par post nasal drip\par \par 10/12/21\par has been well\par still c/o runny nose\par form for residence filled out\par

## 2021-10-12 NOTE — REASON FOR VISIT
[Family Member] : family member [Follow-Up] : a follow-up visit [FreeTextEntry1] : form for residence [FreeTextEntry3] : son

## 2021-10-19 PROBLEM — E55.9 HYPOVITAMINOSIS D: Status: ACTIVE | Noted: 2021-01-01

## 2021-11-01 NOTE — ASSESSMENT
[FreeTextEntry1] : 87 yo man with PMHx of CKD, Hypertension, CHF, HLD, severe ELLIOT on CPAP, Pulmonary HTN, lung nodules and mediastinal adenopathy, Obesity, Alzheimer's dementia presents with his son who provides most of the history for renal function evaluation.\par \par \par # CKD stage 3B (eGFR 30-44 ml/min) - creatinine in April 2021 at 1.5, now up to 1.77- could be progression of ckd vs diuresis with inadequate oral hydration given hypernatremia - would check urinalysis with microscopy and urine lytes for FeNa, not in overt CHF, would hold on furosemide for now with 1.0L/day of fluid restriction and daily weight. Obtain renal/bladder ultrasound to r/o retention/obstruction. Avoid nephrotoxins/ NSAIDs. Repeat renal panel in 4 weeks. \par \par # Hypernatremia, 148- FWD 2.7 L- likely water diuresis with furosemide and inadequate oral hydration, will hold furosemide and improve oral intake\par \par # Hypertension- well controlled bp, continue current regimen, low salt diet and weight reduction\par \par # CHF- appears euvolemic, not in over exacerbation- would hold on furosemide, maintain 1.0 L/day fluid restriction, daily weight, low salt diet  \par \par # Anemia- will check iron studies \par \par # Vit D deficiency- continue Vit D supplements. Will check PTH, phos, calcium next visit\par \par # Obesity- weight reduction, exercise as tolerated\par \par follow up in 4 weeks

## 2021-11-01 NOTE — PHYSICAL EXAM
[General Appearance - Alert] : alert [General Appearance - In No Acute Distress] : in no acute distress [General Appearance - Well Nourished] : well nourished [General Appearance - Well Developed] : well developed [Extraocular Movements] : extraocular movements were intact [Neck Appearance] : the appearance of the neck was normal [Jugular Venous Distention Increased] : there was no jugular-venous distention [] : no respiratory distress [Respiration, Rhythm And Depth] : normal respiratory rhythm and effort [Exaggerated Use Of Accessory Muscles For Inspiration] : no accessory muscle use [Heart Rate And Rhythm] : heart rate was normal and rhythm regular [Heart Sounds] : normal S1 and S2 [No CVA Tenderness] : no ~M costovertebral angle tenderness [Abnormal Walk] : normal gait [Skin Turgor] : normal skin turgor [FreeTextEntry1] : Alzheimer's dementia

## 2021-11-01 NOTE — REASON FOR VISIT
[Consultation] : a consultation visit [Family Member] : family member [FreeTextEntry1] : renal function

## 2021-11-01 NOTE — HISTORY OF PRESENT ILLNESS
[FreeTextEntry1] : 87 yo man with PMHx of CKD, Hypertension, CHF, HLD, severe ELLIOT on CPAP, Pulmonary HTN, lung nodules and mediastinal adenopathy, Obesity, Alzheimer's dementia presents with his son who provides most of the history for renal function evaluation.\par \par Patient denies any recent acute illness, hospitalizations, changes in appetite or weight reduction, no fever, cp, sob, n/v/d, no abdominal or flank pain, edema. Denies dysuria, hematuria, frothy urine.\par No hx of kidney stones, pyelonephritis or relevant family hx.\par Denies NSAIDs use.\par \par /62 mmHg in the office.

## 2021-11-17 PROBLEM — R91.1 LUNG NODULE: Status: ACTIVE | Noted: 2021-03-01

## 2021-11-17 PROBLEM — Z87.09 HISTORY OF CHRONIC OBSTRUCTIVE LUNG DISEASE: Status: RESOLVED | Noted: 2021-04-07 | Resolved: 2021-04-17

## 2021-11-17 NOTE — HISTORY OF PRESENT ILLNESS
[TextBox_4] : 86 year old male with severe ELLIOT on PAP, pulmonary nodules, mediastinal adenopathy, PH, COPD came for f/u.\par Last seen in August 2021\par He is here with his son. Has Alzheimer's.\par Compliance report\par Usage 100%\par Average use 8 hrs 14 min\par Auto 6-20\par Leak 29\par 95% pressure 12.8\par AHI 5.5\par \par He c/o the same rhinorrhea at night despite using the nasal spray. \par Has the same dyspnea on exertion. No cough or chest tightness.\par He is compliant with inhalers\par Uses PAP every night and sleeps well with it. He is able to put the mask by himself.\par Did not receive new supplies [facility] yet\par Does not smoke\par He is off Lasix now after he saw Dr Maldonado \par \par \par

## 2021-11-17 NOTE — PHYSICAL EXAM
[No Acute Distress] : no acute distress [Normal Appearance] : normal appearance [No Neck Mass] : no neck mass [Normal S1, S2] : normal s1, s2 [Irregular rate/rhythm] : irregular rate/rhythm [No Resp Distress] : no resp distress [No Acc Muscle Use] : no acc muscle use [Clear to Auscultation Bilaterally] : clear to auscultation bilaterally [Normal Gait] : normal gait [No Clubbing] : no clubbing [No Cyanosis] : no cyanosis [No Edema] : no edema [No Focal Deficits] : no focal deficits [TextBox_89] : obese [TextBox_140] : awake, alert

## 2021-11-17 NOTE — REVIEW OF SYSTEMS
[Nasal Congestion] : nasal congestion [SOB on Exertion] : sob on exertion [Nocturia] : nocturia [Memory Loss] : memory loss [Obesity] : obesity [Negative] : Psychiatric

## 2021-12-02 PROBLEM — R59.0 MEDIASTINAL ADENOPATHY: Status: ACTIVE | Noted: 2021-04-07

## 2021-12-02 NOTE — HISTORY OF PRESENT ILLNESS
[FreeTextEntry1] : 87 yo man with PMHx of CKD, Hypertension, CHF, HLD, severe ELLIOT on CPAP, Pulmonary HTN, lung nodules and mediastinal adenopathy, Obesity, Alzheimer's dementia is here with son for ckd hollow up.\par \par \par Patient denies any recent acute illness, hospitalizations, changes in appetite or weight reduction, no dizziness, lightheadedness, fever, cp, n/v/d, no abdominal or flank pain, edema. Denies dysuria, hematuria, frothy urine.\par Uses CPAP every night for ELLIOT and sleeps well with it.\par Admits to weight gain from 211 to 218 lbs, as well as sob with exertion and persistent weakness, sleepiness.\par Off furosemide 40 bid but continue metolazone 5mg po qd.\par \par No hx of kidney stones, pyelonephritis or relevant family hx.\par Denies NSAIDs use.\par \par /62 mmHg in the office.\par Last BUN/Cr 56/1.77, eGFR 34 ml/min on 10/12/21, bland urine with no hematuria, proteinuria or casts \par No recent renal images available, not done.

## 2021-12-02 NOTE — ASSESSMENT
[FreeTextEntry1] : 85 yo man with PMHx of CKD, Hypertension, CHF, HLD, severe ELLIOT on CPAP, Pulmonary HTN, lung nodules and mediastinal adenopathy, Obesity, Alzheimer's dementia presents with his son who provides most of the history for renal function evaluation.\par \par \par # CKD stage 3B (eGFR 30-44 ml/min) - creatinine in April 2021 at 1.5, now up to 1.77 on 10/12/21 with bland urine (no hematuria, proteinuria or casts), no resent labs - could be progression of ckd vs diuresis with inadequate oral hydration given hypernatremia - continue to hold furosemide 40 bid and take metolazone 5 qd as it was not dc'ed; would get renal panel with urinalysis today; obtain renal/bladder ultrasound to r/o retention/obstruction. Avoid nephrotoxins/ NSAIDs. Patient is  not in overt CHF, continue with 1.0 L/day of fluid restriction and daily weight. \par \par # Hypernatremia, 148- FWD 2.7 L- likely water diuresis with furosemide and inadequate oral hydration, continue to hold furosemide, will check serum sodium today.\par \par # Hypertension - well controlled bp, continue current regimen, low salt diet and weight reduction\par \par # CHF - appears euvolemic, not in over exacerbation - would continue to hold on furosemide, continue to take metolazone 5 qd and 1.0 L/day fluid restriction, daily weight, low salt diet; will get pro-BNP; last EF 55% \par \par # Anemia, macrocytic - will check iron studies \par \par # Vit B 12 - 233, low normal; will start 1000 mcg Vit B12 po qd \par \par # Vit D deficiency- continue Vit D supplements, will check PTH, phos, calcium \par \par # Obesity- weight reduction, exercise as tolerated\par \par follow up in 2 months\par will call with results

## 2022-01-01 ENCOUNTER — RESULT REVIEW (OUTPATIENT)
Age: 87
End: 2022-01-01

## 2022-01-01 ENCOUNTER — APPOINTMENT (OUTPATIENT)
Dept: GASTROENTEROLOGY | Facility: CLINIC | Age: 87
End: 2022-01-01

## 2022-01-01 ENCOUNTER — TRANSCRIPTION ENCOUNTER (OUTPATIENT)
Age: 87
End: 2022-01-01

## 2022-01-01 ENCOUNTER — APPOINTMENT (OUTPATIENT)
Dept: NEPHROLOGY | Facility: CLINIC | Age: 87
End: 2022-01-01
Payer: MEDICARE

## 2022-01-01 ENCOUNTER — APPOINTMENT (OUTPATIENT)
Dept: NEPHROLOGY | Facility: CLINIC | Age: 87
End: 2022-01-01

## 2022-01-01 ENCOUNTER — RX RENEWAL (OUTPATIENT)
Age: 87
End: 2022-01-01

## 2022-01-01 ENCOUNTER — APPOINTMENT (OUTPATIENT)
Dept: GERIATRICS | Facility: CLINIC | Age: 87
End: 2022-01-01

## 2022-01-01 ENCOUNTER — NON-APPOINTMENT (OUTPATIENT)
Age: 87
End: 2022-01-01

## 2022-01-01 ENCOUNTER — APPOINTMENT (OUTPATIENT)
Dept: CARDIOLOGY | Facility: CLINIC | Age: 87
End: 2022-01-01
Payer: MEDICARE

## 2022-01-01 ENCOUNTER — APPOINTMENT (OUTPATIENT)
Dept: GERIATRICS | Facility: CLINIC | Age: 87
End: 2022-01-01
Payer: MEDICARE

## 2022-01-01 ENCOUNTER — APPOINTMENT (OUTPATIENT)
Dept: PULMONOLOGY | Facility: CLINIC | Age: 87
End: 2022-01-01
Payer: MEDICARE

## 2022-01-01 ENCOUNTER — APPOINTMENT (OUTPATIENT)
Dept: PULMONOLOGY | Facility: CLINIC | Age: 87
End: 2022-01-01

## 2022-01-01 ENCOUNTER — APPOINTMENT (OUTPATIENT)
Dept: CARDIOLOGY | Facility: CLINIC | Age: 87
End: 2022-01-01

## 2022-01-01 VITALS
RESPIRATION RATE: 18 BRPM | OXYGEN SATURATION: 97 % | OXYGEN SATURATION: 97 % | WEIGHT: 220 LBS | SYSTOLIC BLOOD PRESSURE: 120 MMHG | HEIGHT: 67 IN | BODY MASS INDEX: 34.53 KG/M2 | TEMPERATURE: 97.6 F | SYSTOLIC BLOOD PRESSURE: 112 MMHG | TEMPERATURE: 97.1 F | HEART RATE: 88 BPM | DIASTOLIC BLOOD PRESSURE: 70 MMHG | HEART RATE: 73 BPM | HEIGHT: 67 IN | WEIGHT: 222 LBS | DIASTOLIC BLOOD PRESSURE: 62 MMHG | BODY MASS INDEX: 34.84 KG/M2

## 2022-01-01 VITALS
OXYGEN SATURATION: 99 % | SYSTOLIC BLOOD PRESSURE: 115 MMHG | HEART RATE: 64 BPM | OXYGEN SATURATION: 98 % | WEIGHT: 217 LBS | DIASTOLIC BLOOD PRESSURE: 60 MMHG | WEIGHT: 221 LBS | HEIGHT: 67 IN | BODY MASS INDEX: 34.69 KG/M2 | DIASTOLIC BLOOD PRESSURE: 75 MMHG | SYSTOLIC BLOOD PRESSURE: 120 MMHG | HEART RATE: 75 BPM | BODY MASS INDEX: 33.99 KG/M2

## 2022-01-01 VITALS
BODY MASS INDEX: 34.61 KG/M2 | SYSTOLIC BLOOD PRESSURE: 112 MMHG | HEART RATE: 78 BPM | WEIGHT: 221 LBS | DIASTOLIC BLOOD PRESSURE: 78 MMHG | OXYGEN SATURATION: 99 %

## 2022-01-01 VITALS
SYSTOLIC BLOOD PRESSURE: 121 MMHG | TEMPERATURE: 96.6 F | OXYGEN SATURATION: 98 % | HEART RATE: 71 BPM | DIASTOLIC BLOOD PRESSURE: 54 MMHG | HEIGHT: 67 IN | WEIGHT: 217 LBS | BODY MASS INDEX: 34.06 KG/M2

## 2022-01-01 VITALS
TEMPERATURE: 97.7 F | BODY MASS INDEX: 35.47 KG/M2 | HEART RATE: 76 BPM | SYSTOLIC BLOOD PRESSURE: 124 MMHG | HEIGHT: 67 IN | WEIGHT: 226 LBS | OXYGEN SATURATION: 96 % | DIASTOLIC BLOOD PRESSURE: 64 MMHG

## 2022-01-01 VITALS
BODY MASS INDEX: 34.77 KG/M2 | HEART RATE: 85 BPM | TEMPERATURE: 97.9 F | DIASTOLIC BLOOD PRESSURE: 60 MMHG | OXYGEN SATURATION: 100 % | SYSTOLIC BLOOD PRESSURE: 126 MMHG | WEIGHT: 222 LBS

## 2022-01-01 DIAGNOSIS — E78.5 HYPERLIPIDEMIA, UNSPECIFIED: ICD-10-CM

## 2022-01-01 DIAGNOSIS — Z86.79 PERSONAL HISTORY OF OTHER DISEASES OF THE CIRCULATORY SYSTEM: ICD-10-CM

## 2022-01-01 DIAGNOSIS — R05.8 OTHER SPECIFIED COUGH: ICD-10-CM

## 2022-01-01 DIAGNOSIS — Z86.59 PERSONAL HISTORY OF OTHER MENTAL AND BEHAVIORAL DISORDERS: ICD-10-CM

## 2022-01-01 DIAGNOSIS — Z87.438 PERSONAL HISTORY OF OTHER DISEASES OF MALE GENITAL ORGANS: ICD-10-CM

## 2022-01-01 DIAGNOSIS — N18.30 CHRONIC KIDNEY DISEASE, STAGE 3 UNSPECIFIED: ICD-10-CM

## 2022-01-01 DIAGNOSIS — I27.20 PULMONARY HYPERTENSION, UNSPECIFIED: ICD-10-CM

## 2022-01-01 DIAGNOSIS — D64.9 ANEMIA, UNSPECIFIED: ICD-10-CM

## 2022-01-01 DIAGNOSIS — E87.0 HYPEROSMOLALITY AND HYPERNATREMIA: ICD-10-CM

## 2022-01-01 DIAGNOSIS — I38 ENDOCARDITIS, VALVE UNSPECIFIED: ICD-10-CM

## 2022-01-01 DIAGNOSIS — N18.9 CHRONIC KIDNEY DISEASE, UNSPECIFIED: ICD-10-CM

## 2022-01-01 DIAGNOSIS — Z99.89 OBSTRUCTIVE SLEEP APNEA (ADULT) (PEDIATRIC): ICD-10-CM

## 2022-01-01 DIAGNOSIS — G47.33 OBSTRUCTIVE SLEEP APNEA (ADULT) (PEDIATRIC): ICD-10-CM

## 2022-01-01 DIAGNOSIS — N28.1 CYST OF KIDNEY, ACQUIRED: ICD-10-CM

## 2022-01-01 DIAGNOSIS — E79.0 HYPERURICEMIA W/OUT SIGNS OF INFLAMMATORY ARTHRITIS AND TOPHACEOUS DISEASE: ICD-10-CM

## 2022-01-01 DIAGNOSIS — N25.81 SECONDARY HYPERPARATHYROIDISM OF RENAL ORIGIN: ICD-10-CM

## 2022-01-01 DIAGNOSIS — I10 ESSENTIAL (PRIMARY) HYPERTENSION: ICD-10-CM

## 2022-01-01 DIAGNOSIS — E66.9 OBESITY, UNSPECIFIED: ICD-10-CM

## 2022-01-01 DIAGNOSIS — I48.20 CHRONIC ATRIAL FIBRILLATION, UNSP: ICD-10-CM

## 2022-01-01 DIAGNOSIS — I50.9 HEART FAILURE, UNSPECIFIED: ICD-10-CM

## 2022-01-01 DIAGNOSIS — Z86.39 PERSONAL HISTORY OF OTHER ENDOCRINE, NUTRITIONAL AND METABOLIC DISEASE: ICD-10-CM

## 2022-01-01 DIAGNOSIS — F02.80 ALZHEIMER'S DISEASE, UNSPECIFIED: ICD-10-CM

## 2022-01-01 DIAGNOSIS — E53.8 DEFICIENCY OF OTHER SPECIFIED B GROUP VITAMINS: ICD-10-CM

## 2022-01-01 DIAGNOSIS — G30.9 ALZHEIMER'S DISEASE, UNSPECIFIED: ICD-10-CM

## 2022-01-01 DIAGNOSIS — J44.9 CHRONIC OBSTRUCTIVE PULMONARY DISEASE, UNSPECIFIED: ICD-10-CM

## 2022-01-01 LAB
APPEARANCE: CLEAR
BACTERIA: NEGATIVE
BILIRUBIN URINE: NEGATIVE
BLOOD URINE: NEGATIVE
COLOR: YELLOW
GLUCOSE QUALITATIVE U: NEGATIVE
HYALINE CASTS: 0 /LPF
KETONES URINE: NEGATIVE
LEUKOCYTE ESTERASE URINE: NEGATIVE
MICROSCOPIC-UA: NORMAL
NITRITE URINE: NEGATIVE
OSMOLALITY UR: 603 MOSM/KG
PH URINE: 5.5
PROTEIN URINE: NEGATIVE
RED BLOOD CELLS URINE: 2 /HPF
SPECIFIC GRAVITY URINE: >=1.03
SQUAMOUS EPITHELIAL CELLS: 1 /HPF
UROBILINOGEN URINE: NORMAL
WHITE BLOOD CELLS URINE: 1 /HPF

## 2022-01-01 PROCEDURE — 99213 OFFICE O/P EST LOW 20 MIN: CPT | Mod: 25

## 2022-01-01 PROCEDURE — 99214 OFFICE O/P EST MOD 30 MIN: CPT

## 2022-01-01 PROCEDURE — 36415 COLL VENOUS BLD VENIPUNCTURE: CPT

## 2022-01-01 PROCEDURE — 99213 OFFICE O/P EST LOW 20 MIN: CPT

## 2022-01-01 PROCEDURE — P9615: CPT

## 2022-01-01 PROCEDURE — 45382 COLONOSCOPY W/CONTROL BLEED: CPT

## 2022-01-01 PROCEDURE — 93306 TTE W/DOPPLER COMPLETE: CPT

## 2022-01-01 PROCEDURE — 93000 ELECTROCARDIOGRAM COMPLETE: CPT

## 2022-01-01 RX ORDER — DICLOFENAC SODIUM 10 MG/G
1 GEL TOPICAL
Refills: 0 | Status: ACTIVE | COMMUNITY

## 2022-01-01 RX ORDER — FUROSEMIDE 80 MG/1
TABLET ORAL
Refills: 0 | Status: ACTIVE | COMMUNITY

## 2022-01-01 RX ORDER — AZELASTINE HYDROCHLORIDE 137 UG/1
0.1 SPRAY, METERED NASAL TWICE DAILY
Qty: 1 | Refills: 4 | Status: ACTIVE | COMMUNITY
Start: 2022-01-01 | End: 1900-01-01

## 2022-01-01 RX ORDER — IPRATROPIUM BROMIDE 42 UG/1
0.06 SPRAY NASAL
Refills: 0 | Status: ACTIVE | COMMUNITY

## 2022-01-01 RX ORDER — METOLAZONE 5 MG/1
5 TABLET ORAL DAILY
Qty: 30 | Refills: 1 | Status: DISCONTINUED | COMMUNITY
Start: 2022-01-01 | End: 2022-01-01

## 2022-01-01 RX ORDER — ALLOPURINOL 100 MG/1
100 TABLET ORAL DAILY
Qty: 30 | Refills: 3 | Status: ACTIVE | COMMUNITY
Start: 2022-01-01 | End: 1900-01-01

## 2022-01-01 RX ORDER — METOLAZONE 5 MG/1
5 TABLET ORAL
Qty: 30 | Refills: 2 | Status: ACTIVE | COMMUNITY
Start: 2022-01-01

## 2022-01-01 RX ORDER — UBIDECARENONE/VIT E ACET 100MG-5
25 MCG CAPSULE ORAL
Qty: 90 | Refills: 3 | Status: DISCONTINUED | COMMUNITY
Start: 2021-01-01 | End: 2022-01-01

## 2022-01-01 RX ORDER — TERAZOSIN 5 MG/1
5 CAPSULE ORAL
Qty: 28 | Refills: 0 | Status: ACTIVE | COMMUNITY
Start: 2022-01-01

## 2022-01-01 RX ORDER — ALBUTEROL SULFATE 90 UG/1
108 (90 BASE) INHALANT RESPIRATORY (INHALATION)
Qty: 3 | Refills: 3 | Status: ACTIVE | COMMUNITY
Start: 1900-01-01 | End: 1900-01-01

## 2022-01-01 RX ORDER — GUAIFENESIN 600 MG/1
600 TABLET, EXTENDED RELEASE ORAL
Qty: 30 | Refills: 3 | Status: ACTIVE | COMMUNITY
Start: 2022-01-01 | End: 1900-01-01

## 2022-01-01 RX ORDER — UMECLIDINIUM 62.5 UG/1
62.5 AEROSOL, POWDER ORAL
Qty: 1 | Refills: 5 | Status: ACTIVE | COMMUNITY
Start: 2021-04-07 | End: 1900-01-01

## 2022-01-01 RX ORDER — DESVENLAFAXINE SUCCINATE 100 MG/1
TABLET, FILM COATED, EXTENDED RELEASE ORAL
Refills: 0 | Status: ACTIVE | COMMUNITY

## 2022-02-10 PROBLEM — Z86.79 HISTORY OF HEART VALVE ABNORMALITY: Status: RESOLVED | Noted: 2021-04-17 | Resolved: 2022-01-01

## 2022-03-02 PROBLEM — I27.20 PULMONARY HYPERTENSION: Status: ACTIVE | Noted: 2021-03-01

## 2022-03-02 NOTE — REVIEW OF SYSTEMS
[Feeling Fatigued] : feeling fatigued [Hearing Loss] : hearing loss [Joint Pain] : joint pain [Negative] : Heme/Lymph [FreeTextEntry5] : see history of present illness

## 2022-03-02 NOTE — REVIEW OF SYSTEMS
[Postnasal Drip] : postnasal drip [Nocturia] : nocturia [Memory Loss] : memory loss [Obesity] : obesity [Negative] : Hematologic [TextBox_30] : see HPI

## 2022-03-02 NOTE — REASON FOR VISIT
[Follow-Up] : a follow-up visit [Sleep Apnea] : sleep apnea [COPD] : COPD [Pulmonary Hypertension] : pulmonary hypertension

## 2022-03-02 NOTE — PHYSICAL EXAM
[No Acute Distress] : no acute distress [Normal Appearance] : normal appearance [No Neck Mass] : no neck mass [Normal S1, S2] : normal s1, s2 [Irregular rate/rhythm] : irregular rate/rhythm [No Resp Distress] : no resp distress [Clear to Auscultation Bilaterally] : clear to auscultation bilaterally [No Abnormalities] : no abnormalities [No Clubbing] : no clubbing [No Cyanosis] : no cyanosis [No Focal Deficits] : no focal deficits [No Edema] : no edema

## 2022-03-02 NOTE — DISCUSSION/SUMMARY
[FreeTextEntry1] : Congestive heart failure\par The working diagnosis is compensated  congestive heart failure due to heart failure with reduced ejection fraction (47% 2/21 echocardiogram) secondary to hypertensive-probable atherosclerotic heart disease. \par There has been favorable  response to medical therapy. The 2/22 echocardiogram revealed normal left ventricular systolic function with a left ventricular ejection fraction 57%.\par  In the setting of heart failure with reduced ejection fraction ACE-I/ARB  Neprilysin inhibition and beta blocker therapy are attractive.\par  The present cardiac the examination is consistent with a euvolemic state New York Heart Association class II-III .\par \par I have recommended the following\par a. Continue the present medical regimen\par b  No further cardiac testing for this problem at this time\par c. Monitoring of electrolytes and renal function through primary care Dr. Mcfadden\par \par \par \par \par Atrial fibrillation\par The working diagnosis is chronic atrial fibrillation secondary to hypertensive-atherosclerotic heart disease exacerbated by obesity. An elevated"[id number]" score is indicative of an increased risk of systemic/cerebral emboli. In 5/21 Apixaban replaced vitamin K antagonists therapy .The ventricular response in  atrial fibrillation appears to be controlled on the present medical regimen\par \par I have recommended the following\par a.   Anticipate Holter monitor/Zio patch study to assess adequacy of rate control of the ventricular response   later 2022 or 2023 \par \par \par \par \par Hypertension\par Hypertension is controlled on present medical regimen. In the setting of congestive heart failure left ventricular systolic dysfunction  and atrial fibrillation beta blocker and ACE-I/ARB therapy are attractive\par \par I have recommended the following\par a. Continue the present medical regimen\par b. Addition of ACE-I/ARB therapy dependent on the patient's  follow up  hemodynamics/renal function/electrolytes\par \par \par \par \par Hyperlipidemia\par Hyperlipidemia represents a risk factor for atherosclerotic heart disease. There is no clear history of ischemic heart disease. A stress test performed 5-10 years ago was reportedly normal. The details of that evaluation are not available. The target LDL level for primary prevention is about 100.HMG coA reductase inhibitor therapy has been effective. In 4/21 the  serum cholesterol level was  125 triglycerides 134 HDL 47 LDL 51. The dose of atorvastatin may be decreased dependent on followup lipid levels . Nonpharmacological therapy, specifically diet and exercise are emphasized  as  major aspects of treatment\par \par \par I have recommended the following\par a. Prior cardiac records not available at this time requested for review\par b. Target LDL level to about 100 as discussed above\par c. Low-salt low-fat low-cholesterol diet. Exercise to the extent possible\par d. decrease atorvastatin dose dependent on followup lipid levels  as discussed above\par \par Valvular heart disease\par The 2/22 echocardiogram demonstrated mild mitral/tricuspid regurgitation. The left atrial volume index was markedly elevated at 60 mL/m2. Severe pulmonary hypertension was reflected by a  pulmonary systolic pressure of 62 mmHg. The left ventricular ejection fraction was  57%. The present cardiac physical examination is not suggestive of severe mitral regurgitation.\par \par I have recommended the following\par a. No further cardiac testing for this problem at this time.\par \par \par Dementia\par The patient carries a diagnosis of Alzheimer's dementia. In the setting of atrial fibrillation a multi-infarct contribution to dementia  is  common. This problem represents a major health threat. The patient's age and mental status are major influences on management decisions\par \par \par \par Obesity\par Obesity exacerbates Mr. Washington's cardiovascular issues. Today Olvin is  5 feet 7 inches tall and weighs 217   pounds.  He has gained  18  pounds in the last  12  months..  Diet exercise and weight loss are  advised\par \par \par The diagnosis, prognosis, risks, options and alternatives were explained at length to the patient and family. All questions were answered. Issues discussed included shortness of breath congestive heart failure  left ventricular systolic  function  hypertension hyperlipidemia chronic obstructive lung disease noninvasive cardiac testing anticoagulation with various treatment options and monitoring of electrolytes/renal function.\par \par \par Counseling and/or coordination of care\par Time was a significant factor for this patient encounter. Total time spent with the patient and family was 30  minutes. Greater than 50% of the time was devoted to counseling and/or coordination of care\par \par \par \par \par \par

## 2022-03-02 NOTE — HISTORY OF PRESENT ILLNESS
[TextBox_4] : 86 year old le with ELLIOT on PAP, PH, COPD, came for f/u.\par Last seen in Nov 2021.\par he is on Incruse. Takes Incruse every day\par He received the new mask and he likes it.\par Had a period of 2 weeks when he did not use PAP due to some problems in the [place]\par Compliance report seen\par he is compliant with PAP. Leak is acceptable. Residual AHI acceptable\par Did not have Covid infection\par Dyspnea is the same.\par No cough, hemoptysis\par No exacerbations since the last visit

## 2022-03-02 NOTE — HISTORY OF PRESENT ILLNESS
[FreeTextEntry1] : 86-year-old man\par Routine followup\par Main complaint continues to be that of sinus congestion and mucus production. No angina. No orthopnea. No palpitations. No syncope. He has not been exercising and has gained weight\par \par Olvin is accompanied today by his son

## 2022-03-09 PROBLEM — N28.1 RENAL CYST: Status: ACTIVE | Noted: 2022-01-01

## 2022-03-09 NOTE — PHYSICAL EXAM
[General Appearance - Alert] : alert [General Appearance - In No Acute Distress] : in no acute distress [Extraocular Movements] : extraocular movements were intact [Neck Appearance] : the appearance of the neck was normal [Jugular Venous Distention Increased] : there was no jugular-venous distention [Respiration, Rhythm And Depth] : normal respiratory rhythm and effort [Exaggerated Use Of Accessory Muscles For Inspiration] : no accessory muscle use [Heart Rate And Rhythm] : heart rate was normal and rhythm regular [Heart Sounds] : normal S1 and S2 [No CVA Tenderness] : no ~M costovertebral angle tenderness [Auscultation Breath Sounds / Voice Sounds] : lungs were clear to auscultation bilaterally [Edema] : there was no peripheral edema [Musculoskeletal - Swelling] : no joint swelling seen [] : no rash [FreeTextEntry1] : underlying Alzheimer's dementia, oriented to person, place, months but no date

## 2022-03-09 NOTE — ASSESSMENT
[FreeTextEntry1] : 87 yo man with PMHx of CKD, Hypertension, Anemia, CHF, HLD, severe ELLIOT on CPAP, Pulmonary HTN, lung nodules and mediastinal adenopathy, Obesity, Alzheimer's dementia is following for renal function.\par Accompanied by son.\par \par \par # CKD stage 3B (eGFR 30-44 ml/min) - creatinine in April 2021 at 1.5, up to 1.77 on 10/12/21 with bland urine (no hematuria, proteinuria or casts) down to 1.3 in 12/2021- could be progression of ckd vs diuresis with inadequate oral hydration given hypernatremia - continue to hold furosemide 40 bid and take metolazone 5 qd only - will repeat renal panel with cystatin C based eGFR, urinalysis w/ micro and ulytes today. Continue to avoid nephrotoxins/ NSAIDs. Continue with 1.0 L/day of fluid restriction and daily weight. \par \par #Renal cyst - renal/bladder ultrasound 3/7/22: Right kidney: 10.5 cm. Prominent right parapelvic cyst is unchanged. No renal mass, hydronephrosis or calculi. Left kidney: 11.6 cm. No renal mass, hydronephrosis or calculi. Incidentally noted cyst within the lower pole measuring up to 2.7 cm, minimally increased in size. Urinary bladder within normal limits. The prostate is enlarged.\par    \par #Hypernatremia - likely water diuresis with furosemide and inadequate oral hydration, improved with holding furosemide - will repeat serum sodium today\par \par #Hypertension - well controlled bp, continue current regimen, low salt diet and weight reduction\par \par #CHF - appears euvolemic, not in over exacerbation - would continue to hold on furosemide, continue to take metolazone 5 qd and 1.0 L/day fluid restriction, daily weight, low salt diet; exercise as tolerated for weight control and reduction strongly recommended\par \par # Anemia, macrocytic - low normal Vit B12 at 233, started on 1000 mcg Vit B12 po qd; iron noted and acceptable - will repeat cbc, Vit B12, check folic acid today; continue Vit B12 supplements for now \par \par # Secondary HPTH likely due to Vit D deficiency - will repeat PTH, Vit D, calcium, phos today - continue daily Vit D supplements\par \par # Obesity - exercise as tolerated for weight control and reduction strongly recommended, will check thyroid panel \par \par \par will call with results\par follow up in 3 months

## 2022-03-09 NOTE — HISTORY OF PRESENT ILLNESS
[FreeTextEntry1] : 85 yo man with PMHx of CKD, Hypertension, Anemia, CHF, HLD, severe ELLIOT on CPAP, Pulmonary HTN, lung nodules and mediastinal adenopathy, Obesity, Alzheimer's dementia is following for renal function.\par Patient lives at assisted living facility with his wife who has Parkinson's ds. Accompanied by son today.\par \par no recent hospitalizations or changes in meds \par appetite is ok with weight gain, no changes in taste\par feels tired and sleepy, does not active as before \par no dizziness, lightheadedness or unstediness\par no sob at rest and on exertion, no cp\par no n/v/d, abdominal or flank pain, edema\par no dysuria, hematuria\par has right knee pain taking tylenol, denies NSAIDs\par hx left knee replacement \par \par compliant with CPAP using every night for ELLIOT, well tolerated\par \par continue to be off furosemide 40 bid, only on metolazone 5mg po daily\par \par No hx of kidney stones, pyelonephritis or relevant family hx\par \par Renal/bladder ultrasound 3/7/22: Right kidney: 10.5 cm. Prominent right parapelvic cyst is unchanged. No renal mass, hydronephrosis or calculi. Left kidney: 11.6 cm. No renal mass, hydronephrosis or calculi. Incidentally noted cyst within the lower pole measuring up to 2.7 cm, minimally increased in size. Urinary bladder within normal limits. The prostate is enlarged.\par

## 2022-03-15 NOTE — HISTORY OF PRESENT ILLNESS
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[employer]\par \par 7/20/21\par c/o runny nose\par halls/ricola\par post nasal drip\par \par 10/12/21\par has been well\par still c/o runny nose\par form for residence filled out\par \par 3/15/22\par has been to renal, cards and pulmonary\par has some memory deficits - here with son\par \par creat up to 1.6\par off lasix but on metalzonee

## 2022-03-15 NOTE — PHYSICAL EXAM
[General Appearance - Alert] : alert [General Appearance - In No Acute Distress] : in no acute distress [General Appearance - Well Nourished] : well nourished [General Appearance - Well Developed] : well developed [General Appearance - Well-Appearing] : healthy appearing [Sclera] : the sclera and conjunctiva were normal [PERRL With Normal Accommodation] : pupils were equal in size, round, and reactive to light [Outer Ear] : the ears and nose were normal in appearance [Neck Appearance] : the appearance of the neck was normal [Respiration, Rhythm And Depth] : normal respiratory rhythm and effort [Exaggerated Use Of Accessory Muscles For Inspiration] : no accessory muscle use [Auscultation Breath Sounds / Voice Sounds] : lungs were clear to auscultation bilaterally [Apical Impulse] : the apical impulse was normal [Murmurs] : no murmurs [Abdomen Soft] : soft [Abdomen Tenderness] : non-tender [No Spinal Tenderness] : no spinal tenderness [Involuntary Movements] : no involuntary movements were seen [] : no rash [No Focal Deficits] : no focal deficits [FreeTextEntry1] : [facility] 28/30 last visit

## 2022-03-15 NOTE — ASSESSMENT
[FreeTextEntry1] : pt is feeling much better\par not doing w/up on lung nodules - no SOB\par breathing well\par bothered by runny nose - had procedure to lessen\par restart the flonase\par had labs in april\par received medical records\par \par 10/12/21\par forms for [employer]  [employer]\par checkc labs\par had flu vaccine\par booster pending\par \par 3/15/22\par pts creatinine is increased to 1.6\par suggesting decrease metalazone to qod\par as bp low normal\par and virtually no edema\par urged to lose weight, exercise as toleratate

## 2022-03-15 NOTE — REVIEW OF SYSTEMS
[Feeling Tired] : feeling tired [Recent Weight Gain (___ Lbs)] : recent [unfilled] ~Ulb weight gain [Confused] : confusion [Negative] : Heme/Lymph [FreeTextEntry4] : runny nose

## 2022-06-09 PROBLEM — E87.0 HYPERNATREMIA: Status: ACTIVE | Noted: 2021-01-01

## 2022-06-09 PROBLEM — N25.81 SECONDARY HYPERPARATHYROIDISM: Status: ACTIVE | Noted: 2022-01-01

## 2022-06-09 PROBLEM — D64.9 ANEMIA: Status: ACTIVE | Noted: 2021-01-01

## 2022-06-09 NOTE — HISTORY OF PRESENT ILLNESS
[FreeTextEntry1] : 87 yo man with PMHx of CKD, Hypertension, Anemia, CHF, HLD, severe ELLIOT on CPAP, Pulmonary HTN, lung nodules and mediastinal adenopathy, Obesity, Alzheimer's dementia, s/p L knee replacement presenting today for  follow up with chronic kidney disease.\par Patient lives at assisted living facility with his wife who has Parkinson's ds. \par Accompanied by son today.\par \par no acute interim events\par denies recent hospitalizations or changes in medications\par no NSAIDs or recent contrast exposure\par continues gaining weight with lack of exercise and decreased activity due to R knee pain \par no changes in taste, no nausea, vomiting or diarrhea\par sob with exertion, no chest pain, edema \par no abdominal or flank pain\par denies changes in urination, no dysuria or hematuria\par \par continue to be off furosemide 40 bid, only on metolazone 5mg po MWF\par \par No hx of kidney stones, pyelonephritis or relevant family hx\par \par Renal/bladder ultrasound 3/7/22: Right kidney: 10.5 cm. Prominent right parapelvic cyst is unchanged. No renal mass, hydronephrosis or calculi. Left kidney: 11.6 cm. No renal mass, hydronephrosis or calculi. Incidentally noted cyst within the lower pole measuring up to 2.7 cm, minimally increased in size. Urinary bladder within normal limits. The prostate is enlarged.\par

## 2022-06-09 NOTE — ASSESSMENT
[FreeTextEntry1] : 87 yo man with PMHx of CKD, Hypertension, Anemia, CHF, HLD, severe ELLIOT on CPAP, Pulmonary HTN, lung nodules and mediastinal adenopathy, Obesity, Alzheimer's dementia, s/p L knee replacement presenting today for  follow up with chronic kidney disease.\par \par \par # CKD stage 3B (eGFR 30-44 ml/min) - sCr 1.5 (4/2021) -> 1.77 (10/2021) -> 1.3 (12/2021) -> 1.6 (3/2022), bland urine (no hematuria, proteinuria or casts), negative MM workup - likely progression of ckd vs diuresis with inadequate oral hydration given hypernatremia  \par - obtain renal panel with electrolytes today\par - check serum uric acid, CPK\par - obtain urinalysis w/ micro and ulytes\par - avoid nephrotoxins/ NSAIDs\par - 1.0 L/day of fluid restriction and daily weight\par    \par #Hypernatremia - likely water diuresis with furosemide and inadequate oral hydration, improved with holding furosemide - will repeat serum sodium today\par \par #Hypertension - well controlled bp, continue current regimen, low salt diet and weight reduction\par \par #CHF - not in over exacerbation\par - continue metolazone 5 mg MWF, will consider resuming furosemide \par - 1.0 L/day fluid restriction, low salt diet daily weight\par - exercise as tolerated for weight control and reduction strongly recommended\par \par # Anemia, macrocytic - low normal Vit B12\par - continue Vit B12 supplements\par \par # Secondary HPTH likely due to Vit D deficiency\par - continue daily Vit D supplements\par \par # Obesity - exercise as tolerated for weight control and reduction strongly recommended \par \par \par follow up in 3 months

## 2022-06-09 NOTE — PHYSICAL EXAM
[General Appearance - Alert] : alert [General Appearance - In No Acute Distress] : in no acute distress [Extraocular Movements] : extraocular movements were intact [Jugular Venous Distention Increased] : there was no jugular-venous distention [] : no respiratory distress [Respiration, Rhythm And Depth] : normal respiratory rhythm and effort [Exaggerated Use Of Accessory Muscles For Inspiration] : no accessory muscle use [Auscultation Breath Sounds / Voice Sounds] : lungs were clear to auscultation bilaterally [Heart Rate And Rhythm] : heart rate was normal and rhythm regular [Heart Sounds] : normal S1 and S2 [No CVA Tenderness] : no ~M costovertebral angle tenderness [Musculoskeletal - Swelling] : no joint swelling seen [No Focal Deficits] : no focal deficits [FreeTextEntry1] : underlying Alzheimer's dementia Azithromycin Pregnancy And Lactation Text: This medication is considered safe during pregnancy and is also secreted in breast milk.

## 2022-08-02 PROBLEM — G30.9 ALZHEIMER'S DEMENTIA: Status: ACTIVE | Noted: 2021-04-17

## 2022-08-02 PROBLEM — N18.30 CKD (CHRONIC KIDNEY DISEASE) STAGE 3, GFR 30-59 ML/MIN: Status: ACTIVE | Noted: 2021-01-01

## 2022-08-02 NOTE — HISTORY OF PRESENT ILLNESS
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[employer]\par \par 7/20/21\par c/o runny nose\par halls/ricola\par post nasal drip\par \par 10/12/21\par has been well\par still c/o runny nose\par form for residence filled out\par \par 3/15/22\par has been to renal, cards and pulmonary\par has some memory deficits - here with son\par \par creat up to 1.6\par off lasix but on metalzonee\par \par 8/2/22\par chronic cough and sputum \par lots of throat clearing and sputum at night\par uses halls to help\par chronic post nasal drip\par pain in rt knee\par encouraging him to exercise\par always tired\par sleeps a lot\par wears cpap at night\par to see pulmonary next week\par

## 2022-08-02 NOTE — REVIEW OF SYSTEMS
[Feeling Tired] : feeling tired [Recent Weight Gain (___ Lbs)] : recent [unfilled] ~Ulb weight gain [Confused] : confusion [Negative] : Heme/Lymph [FreeTextEntry2] : sputum, throat clearing [FreeTextEntry3] : hard of hearing [FreeTextEntry4] : mucous

## 2022-08-02 NOTE — ASSESSMENT
[FreeTextEntry1] : pt is feeling much better\par not doing w/up on lung nodules - no SOB\par breathing well\par bothered by runny nose - had procedure to lessen\par restart the flonase\par had labs in april\par received medical records\par \par 10/12/21\par forms for [employer]  [employer]\par checkc labs\par had flu vaccine\par booster pending\par \par 3/15/22\par pts creatinine is increased to 1.6\par suggesting decrease metalazone to qod\par as bp low normal\par and virtually no edema\par urged to lose weight, exercise as toleratate\par \par 8/2/22\par creat has been stable\par BP normal\par c/o mucous in mouth\par was using ipatrpium nasal spray - son thinks still is\par lungs are clear\par oxygen good\par encouraged exercise\par had boosters\par

## 2022-08-02 NOTE — PHYSICAL EXAM
[General Appearance - Alert] : alert [General Appearance - In No Acute Distress] : in no acute distress [General Appearance - Well Nourished] : well nourished [General Appearance - Well Developed] : well developed [General Appearance - Well-Appearing] : healthy appearing [Sclera] : the sclera and conjunctiva were normal [PERRL With Normal Accommodation] : pupils were equal in size, round, and reactive to light [Outer Ear] : the ears and nose were normal in appearance [Neck Appearance] : the appearance of the neck was normal [Respiration, Rhythm And Depth] : normal respiratory rhythm and effort [Exaggerated Use Of Accessory Muscles For Inspiration] : no accessory muscle use [Auscultation Breath Sounds / Voice Sounds] : lungs were clear to auscultation bilaterally [Apical Impulse] : the apical impulse was normal [Murmurs] : no murmurs [Abdomen Soft] : soft [Abdomen Tenderness] : non-tender [No Spinal Tenderness] : no spinal tenderness [Involuntary Movements] : no involuntary movements were seen [] : no rash [No Focal Deficits] : no focal deficits [de-identified] : pharynx clear [de-identified] : seb keratoses on back [FreeTextEntry1] : [facility] 28/30 last visit

## 2022-08-08 PROBLEM — J44.9 COPD (CHRONIC OBSTRUCTIVE PULMONARY DISEASE): Status: ACTIVE | Noted: 2022-01-01

## 2022-08-08 PROBLEM — R05.8 UPPER AIRWAY COUGH SYNDROME: Status: ACTIVE | Noted: 2021-03-01

## 2022-08-08 PROBLEM — G47.33 OSA ON CPAP: Status: ACTIVE | Noted: 2021-03-01

## 2022-08-08 NOTE — PHYSICAL EXAM
[No Acute Distress] : no acute distress [Normal Appearance] : normal appearance [Irregular rate/rhythm] : irregular rate/rhythm [No Resp Distress] : no resp distress [Clear to Auscultation Bilaterally] : clear to auscultation bilaterally [No Abnormalities] : no abnormalities [Normal Gait] : normal gait [No Clubbing] : no clubbing [No Cyanosis] : no cyanosis [No Focal Deficits] : no focal deficits [Normal Mood] : normal mood [Normal Affect] : normal affect

## 2022-08-08 NOTE — REVIEW OF SYSTEMS
[Postnasal Drip] : postnasal drip [Memory Loss] : memory loss [Negative] : Dermatologic [TextBox_30] : see HPI

## 2022-08-08 NOTE — HISTORY OF PRESENT ILLNESS
[TextBox_4] : 87 year old male with Alzheimer's COPD, ELLIOT on PAP came for f/u.\par Last seen in March 2022.\par Feels OK\par No new respiratory complaints.\par No exacerbations since the last visit.\par He is using Incruse every day.\par Does not need to use Albuterol.\par Compliant with PAP. Uses it every night\par No mask problems\par He still c/o post nasal drip worse at night. He saw an ENT doctor and had a procedure for that but not successful.\par Has tried Atrovent nasal and Flonase\par Coughs because of that\par No hemoptysis.\par Hx provided mainly by his son\par \par \par \par \par

## 2022-08-29 PROBLEM — N18.9 CHRONIC RENAL INSUFFICIENCY: Status: RESOLVED | Noted: 2021-05-18 | Resolved: 2022-01-01

## 2022-08-30 PROBLEM — I10 HYPERTENSION: Status: ACTIVE | Noted: 2021-04-17

## 2022-08-30 PROBLEM — Z86.79 HISTORY OF CONGESTIVE HEART FAILURE: Status: RESOLVED | Noted: 2021-04-17 | Resolved: 2022-01-01

## 2022-08-30 PROBLEM — Z87.438 HISTORY OF BENIGN PROSTATIC HYPERPLASIA: Status: RESOLVED | Noted: 2022-01-01 | Resolved: 2022-01-01

## 2022-08-30 PROBLEM — Z86.39 HISTORY OF VITAMIN D DEFICIENCY: Status: RESOLVED | Noted: 2021-01-01 | Resolved: 2022-01-01

## 2022-08-30 PROBLEM — Z86.59 HISTORY OF DEMENTIA: Status: RESOLVED | Noted: 2021-04-17 | Resolved: 2022-01-01

## 2022-08-30 PROBLEM — I38 VALVULAR HEART DISEASE: Status: ACTIVE | Noted: 2022-01-01

## 2022-08-30 PROBLEM — E78.5 HYPERLIPIDEMIA: Status: ACTIVE | Noted: 2021-04-17

## 2022-08-30 PROBLEM — E79.0 HYPERURICEMIA: Status: RESOLVED | Noted: 2022-01-01 | Resolved: 2022-01-01

## 2022-08-30 PROBLEM — E53.8 VITAMIN B12 DEFICIENCY: Status: RESOLVED | Noted: 2021-01-01 | Resolved: 2022-01-01

## 2022-08-30 PROBLEM — I48.20 CHRONIC ATRIAL FIBRILLATION: Status: ACTIVE | Noted: 2021-04-17

## 2022-08-30 PROBLEM — I50.9 CHF (CONGESTIVE HEART FAILURE): Status: ACTIVE | Noted: 2021-04-16

## 2022-08-30 PROBLEM — E66.9 OBESITY: Status: ACTIVE | Noted: 2021-04-17

## 2022-08-30 NOTE — DISCUSSION/SUMMARY
[FreeTextEntry1] : Congestive heart failure\par The working diagnosis is compensated  congestive heart failure due to heart failure with reduced ejection fraction (47% 2/21 echocardiogram) secondary to hypertensive-probable atherosclerotic heart disease. \par There has been favorable  response to medical therapy. The 2/22 echocardiogram revealed normal left ventricular systolic function with a left ventricular ejection fraction 57%.\par  In the setting of heart failure with reduced ejection fraction ACE-I/ARB  Neprilysin inhibition and beta blocker therapy are attractive.\par \par Renal insufficiency is a major influence on medical management. . There is a report of holding furosemide in the recent past in response to decreased renal function. He is presently maintained on the regimen of furosemide 40 mg a day Zaroxolyn 5 mg t.i.w. \par  The present cardiac the examination is consistent with a euvolemic state New York Heart Association class II-III .\par \par I have recommended the following\par a. Continue the present medical regimen\par b  No further cardiac testing for this problem at this time\par c. Monitoring of electrolytes and renal function through primary care Dr. Mcfadden and nephrology Dr. Meyer\par \par \par \par \par Atrial fibrillation\par The working diagnosis is chronic atrial fibrillation secondary to hypertensive-atherosclerotic heart disease exacerbated by obesity. An elevated"[id number]" score is indicative of an increased risk of systemic/cerebral emboli. In 5/21 Apixaban replaced vitamin K antagonist  therapy .The ventricular response in  atrial fibrillation appears to be controlled on the present medical regimen\par \par I have recommended the following\par a.   Anticipate Holter monitor/Zio patch study to assess adequacy of rate control of the ventricular response    2023 \par \par \par \par \par Hypertension\par Hypertension is controlled on present medical regimen. In the setting of congestive heart failure left ventricular systolic dysfunction  and atrial fibrillation beta blocker and ACE-I/ARB therapy are attractive\par \par I have recommended the following\par a. Continue the present medical regimen\par b. Addition of ACE-I/ARB therapy dependent on the patient's  follow up  hemodynamics/renal function/electrolytes\par \par \par \par \par Hyperlipidemia\par Hyperlipidemia represents a risk factor for atherosclerotic heart disease. There is no clear history of ischemic heart disease. A stress test performed 5-10 years ago was reportedly normal. The details of that evaluation are not available. The target LDL level for primary prevention is about 100.HMG coA reductase inhibitor therapy has been effective. In 4/21 the  serum cholesterol level was  125 triglycerides 134 HDL 47 LDL 51. The dose of atorvastatin may be decreased dependent on followup lipid levels . Nonpharmacological therapy, specifically diet and exercise are emphasized  as  major aspects of treatment\par \par \par I have recommended the following\par a. Prior cardiac records not available at this time requested for review\par b. Target LDL level to about 100 as discussed above\par c. Low-salt low-fat low-cholesterol diet. Exercise to the extent possible\par d. decrease atorvastatin dose dependent on followup lipid levels  as discussed above\par \par \par \par Valvular heart disease\par The 2/22 echocardiogram demonstrated mild mitral/tricuspid regurgitation. The left atrial volume index was markedly elevated at 60 mL/m2. Severe pulmonary hypertension was reflected by a  pulmonary systolic pressure of 62 mmHg. The left ventricular ejection fraction was  57%. The present cardiac physical examination is not suggestive of severe mitral regurgitation.\par \par I have recommended the following\par a. No further cardiac testing for this problem at this time.\par \par \par Dementia\par The patient carries a diagnosis of Alzheimer's dementia. In the setting of atrial fibrillation a multi-infarct contribution to dementia  is  common. This problem represents a major health threat. The patient's age and mental status are major influences on management decisions\par \par \par \par Obesity\par Obesity exacerbates Mr. Washington's cardiovascular issues. Today Olvin is  5 feet 7 inches tall and weighs 217   pounds.  He has gained  18  pounds in the last  12  months..  Diet exercise and weight loss are  advised\par \par \par The diagnosis, prognosis, risks, options and alternatives were explained at length to the patient and family. All questions were answered. Issues discussed included diuretic therapy, renal insufficiency  congestive heart failure  left ventricular systolic  function  hypertension hyperlipidemia chronic obstructive lung disease noninvasive cardiac testing anticoagulation with various treatment options and monitoring of electrolytes/renal function.\par \par \par Counseling and/or coordination of care\par Time was a significant factor for this patient encounter. Total time spent with the patient and family was 30  minutes. Greater than 50% of the time was devoted to counseling and/or coordination of care\par \par \par \par \par \par

## 2022-08-30 NOTE — REVIEW OF SYSTEMS
[Feeling Fatigued] : feeling fatigued [Hearing Loss] : hearing loss [Joint Pain] : joint pain [Negative] : Heme/Lymph [FreeTextEntry3] : glasses [FreeTextEntry5] : see  history of present illness

## 2022-08-30 NOTE — HISTORY OF PRESENT ILLNESS
[FreeTextEntry1] : 87-year-old man\par Routine followup\par In complaint continues to be that of nighttime secretions in his throat which impaired his ability to sleep. No chest pain. No palpitations. No orthopnea. No bleeding complications while taking apixaban.\par \par In 6/22 diuretic therapy was reportedly  reduced  (lasix held)  in response to worsening renal function by Dr. Meyer but has been reinstituted at this time according to the facility medication list.\par \par Mr. Washington is accompanied today by his son

## 2022-10-13 ENCOUNTER — NON-APPOINTMENT (OUTPATIENT)
Age: 87
End: 2022-10-13

## 2022-11-01 ENCOUNTER — APPOINTMENT (OUTPATIENT)
Dept: GERIATRICS | Facility: CLINIC | Age: 87
End: 2022-11-01

## 2023-01-04 ENCOUNTER — APPOINTMENT (OUTPATIENT)
Dept: PULMONOLOGY | Facility: CLINIC | Age: 88
End: 2023-01-04

## 2023-02-21 ENCOUNTER — APPOINTMENT (OUTPATIENT)
Dept: CARDIOLOGY | Facility: CLINIC | Age: 88
End: 2023-02-21
